# Patient Record
Sex: FEMALE | Race: WHITE | NOT HISPANIC OR LATINO | Employment: FULL TIME | URBAN - METROPOLITAN AREA
[De-identification: names, ages, dates, MRNs, and addresses within clinical notes are randomized per-mention and may not be internally consistent; named-entity substitution may affect disease eponyms.]

---

## 2023-02-27 LAB
COBALAMIN (VITAMIN B12) (PG/ML) IN SER/PLAS: 404 PG/ML (ref 211–911)
FOLATE (NG/ML) IN SER/PLAS: 10.3 NG/ML
IRON (UG/DL) IN SER/PLAS: 67 UG/DL (ref 35–150)
IRON BINDING CAPACITY (UG/DL) IN SER/PLAS: 321 UG/DL (ref 240–445)
IRON SATURATION (%) IN SER/PLAS: 21 % (ref 25–45)

## 2023-02-28 LAB — FERRITIN (UG/LL) IN SER/PLAS: 114 UG/L (ref 8–150)

## 2023-03-02 LAB
COPPER: 137.9 UG/DL (ref 80–155)
ZINC,SERUM OR PLASMA: 75.6 UG/DL (ref 60–120)

## 2023-03-05 LAB — VITAMIN B1, WHOLE BLOOD: 161 NMOL/L (ref 70–180)

## 2023-04-04 ENCOUNTER — TELEPHONE (OUTPATIENT)
Dept: PRIMARY CARE | Facility: CLINIC | Age: 33
End: 2023-04-04
Payer: COMMERCIAL

## 2023-04-04 DIAGNOSIS — N39.0 RECURRENT UTI: Primary | ICD-10-CM

## 2023-04-04 RX ORDER — NITROFURANTOIN 25; 75 MG/1; MG/1
100 CAPSULE ORAL NIGHTLY
Qty: 90 CAPSULE | Refills: 1 | Status: CANCELLED | OUTPATIENT
Start: 2023-04-04 | End: 2023-10-01

## 2023-04-04 RX ORDER — CIPROFLOXACIN 500 MG/1
500 TABLET ORAL 2 TIMES DAILY
Qty: 10 TABLET | Refills: 0 | Status: SHIPPED | OUTPATIENT
Start: 2023-04-04 | End: 2023-04-09

## 2023-04-05 DIAGNOSIS — N39.0 RECURRENT UTI: Primary | ICD-10-CM

## 2023-04-05 RX ORDER — NITROFURANTOIN 25; 75 MG/1; MG/1
100 CAPSULE ORAL DAILY
Qty: 90 CAPSULE | Refills: 1 | Status: SHIPPED | OUTPATIENT
Start: 2023-04-05 | End: 2023-10-02

## 2023-04-05 NOTE — PROGRESS NOTES
Subjective   Patient ID: Haley Drake is a 32 y.o. female who presents for No chief complaint on file..  HPI  Paged last evening due to dysuria and hematuria with clot.  Patient states this is at least third UTI in last 6 months.  Review of Systems    Objective   There were no vitals filed for this visit.   Physical Exam    Assessment/Plan   There are no diagnoses linked to this encounter.  Will treat current symptoms with Cipro 500 BID x 5 days, then to start nightly prophylaxis with Macrobid 100 mg.  Problem List Items Addressed This Visit          Genitourinary    Recurrent UTI - Primary    Relevant Medications    nitrofurantoin, macrocrystal-monohydrate, (Macrobid) 100 mg capsule

## 2023-04-29 DIAGNOSIS — F41.1 GENERALIZED ANXIETY DISORDER: ICD-10-CM

## 2023-05-01 RX ORDER — HYDROXYZINE HYDROCHLORIDE 50 MG/1
TABLET, FILM COATED ORAL
Qty: 60 TABLET | Refills: 2 | Status: SHIPPED | OUTPATIENT
Start: 2023-05-01 | End: 2024-01-15 | Stop reason: WASHOUT

## 2023-05-01 RX ORDER — HYDROXYZINE HYDROCHLORIDE 50 MG/1
50 TABLET, FILM COATED ORAL 3 TIMES DAILY
COMMUNITY
Start: 2022-03-30 | End: 2023-05-01 | Stop reason: SDUPTHER

## 2023-05-26 ENCOUNTER — OFFICE VISIT (OUTPATIENT)
Dept: PRIMARY CARE | Facility: CLINIC | Age: 33
End: 2023-05-26
Payer: COMMERCIAL

## 2023-05-26 VITALS
DIASTOLIC BLOOD PRESSURE: 83 MMHG | HEART RATE: 90 BPM | WEIGHT: 219 LBS | OXYGEN SATURATION: 98 % | BODY MASS INDEX: 40.06 KG/M2 | SYSTOLIC BLOOD PRESSURE: 114 MMHG

## 2023-05-26 DIAGNOSIS — T50.8X5D ADVERSE EFFECT OF CONTRAST MEDIA, SUBSEQUENT ENCOUNTER: Primary | ICD-10-CM

## 2023-05-26 DIAGNOSIS — T82.9XXA COMPLICATION OF INTRAVENOUS CATHETER SITE: ICD-10-CM

## 2023-05-26 PROCEDURE — 99213 OFFICE O/P EST LOW 20 MIN: CPT | Performed by: FAMILY MEDICINE

## 2023-05-26 PROCEDURE — 1036F TOBACCO NON-USER: CPT | Performed by: FAMILY MEDICINE

## 2023-05-26 RX ORDER — LEVONORGESTREL 52 MG/1
INTRAUTERINE DEVICE INTRAUTERINE
COMMUNITY
Start: 2023-01-24

## 2023-05-26 RX ORDER — METFORMIN HYDROCHLORIDE 500 MG/1
1 TABLET ORAL
COMMUNITY
Start: 2023-02-09 | End: 2024-02-15 | Stop reason: ALTCHOICE

## 2023-05-26 RX ORDER — ALBUTEROL SULFATE 90 UG/1
AEROSOL, METERED RESPIRATORY (INHALATION)
COMMUNITY
Start: 2020-04-01

## 2023-05-26 RX ORDER — ONDANSETRON HYDROCHLORIDE 8 MG/1
TABLET, FILM COATED ORAL EVERY 8 HOURS
COMMUNITY
Start: 2022-08-19 | End: 2023-12-06 | Stop reason: SDUPTHER

## 2023-05-26 RX ORDER — PRENATAL VIT CALC,IRON,FOLIC
TABLET ORAL
COMMUNITY

## 2023-05-26 RX ORDER — AMITRIPTYLINE HYDROCHLORIDE 50 MG/1
1 TABLET, FILM COATED ORAL NIGHTLY
COMMUNITY
Start: 2021-02-19

## 2023-05-26 NOTE — PROGRESS NOTES
Subjective   Patient ID: Haley Drake is a 32 y.o. female who presents for a vein injury.  She was getting a CT yesterday at [facility], with contrast.  The IV in her R antecubital fossa started blowing right away, and she had pain in her arm.  They went ahead and did the CT but realized that she had a fair amount of CT contrast in her arm.  She was taken to the ER to be seen.      ER notes reviewed    Histories reviewed      Objective   /83   Pulse 90   Wt 99.3 kg (219 lb)   SpO2 98%   BMI 40.06 kg/m²    Physical Exam    Alert adult woman, NAD    R antecubital fossa swollen and very tender.  Purple discoloration over the IV sit, about 1 inch long.    Hand with normal sensation, normal  strength      Problem List Items Addressed This Visit    None  Visit Diagnoses       Adverse effect of contrast media, subsequent encounter    -  Primary    Complication of intravenous catheter site              I advised heat for the swollen area on the inside of her arm, but ice applied to the IV site and area of most pain

## 2023-08-14 ENCOUNTER — TELEPHONE (OUTPATIENT)
Dept: PRIMARY CARE | Facility: CLINIC | Age: 33
End: 2023-08-14
Payer: COMMERCIAL

## 2023-08-14 NOTE — TELEPHONE ENCOUNTER
pt called in with back pain that is wrapping around to her chest. started out as shoulder blade pain now behind left breast and spasming. painful to breathe    I spoke with patient and she agreed to be seen at the ED quickly as advised

## 2023-12-06 ENCOUNTER — OFFICE VISIT (OUTPATIENT)
Dept: PRIMARY CARE | Facility: CLINIC | Age: 33
End: 2023-12-06
Payer: COMMERCIAL

## 2023-12-06 VITALS
HEART RATE: 97 BPM | WEIGHT: 215.6 LBS | BODY MASS INDEX: 40.08 KG/M2 | DIASTOLIC BLOOD PRESSURE: 65 MMHG | SYSTOLIC BLOOD PRESSURE: 100 MMHG | OXYGEN SATURATION: 96 %

## 2023-12-06 DIAGNOSIS — K52.9 ACUTE GASTROENTERITIS: Primary | ICD-10-CM

## 2023-12-06 PROBLEM — Z98.84 BARIATRIC SURGERY STATUS: Status: ACTIVE | Noted: 2023-12-06

## 2023-12-06 PROBLEM — N87.1 CERVICAL INTRAEPITHELIAL NEOPLASIA GRADE 2: Status: ACTIVE | Noted: 2022-01-14

## 2023-12-06 PROBLEM — F51.04 CHRONIC INSOMNIA: Status: ACTIVE | Noted: 2023-12-06

## 2023-12-06 PROBLEM — G43.009 MIGRAINE WITHOUT AURA, NOT REFRACTORY: Status: ACTIVE | Noted: 2023-12-06

## 2023-12-06 PROBLEM — G47.33 OBSTRUCTIVE SLEEP APNEA, ADULT: Status: ACTIVE | Noted: 2023-12-06

## 2023-12-06 PROBLEM — K21.9 GERD WITHOUT ESOPHAGITIS: Status: ACTIVE | Noted: 2023-12-06

## 2023-12-06 PROBLEM — G89.29 CHRONIC MIDLINE LOW BACK PAIN WITHOUT SCIATICA: Status: ACTIVE | Noted: 2023-12-06

## 2023-12-06 PROBLEM — M54.50 CHRONIC MIDLINE LOW BACK PAIN WITHOUT SCIATICA: Status: ACTIVE | Noted: 2023-12-06

## 2023-12-06 PROCEDURE — 99213 OFFICE O/P EST LOW 20 MIN: CPT | Performed by: FAMILY MEDICINE

## 2023-12-06 PROCEDURE — 1036F TOBACCO NON-USER: CPT | Performed by: FAMILY MEDICINE

## 2023-12-06 RX ORDER — ONDANSETRON HYDROCHLORIDE 8 MG/1
8 TABLET, FILM COATED ORAL EVERY 8 HOURS PRN
Qty: 20 TABLET | Refills: 0 | Status: SHIPPED | OUTPATIENT
Start: 2023-12-06

## 2023-12-06 ASSESSMENT — PATIENT HEALTH QUESTIONNAIRE - PHQ9
SUM OF ALL RESPONSES TO PHQ9 QUESTIONS 1 AND 2: 0
2. FEELING DOWN, DEPRESSED OR HOPELESS: NOT AT ALL
1. LITTLE INTEREST OR PLEASURE IN DOING THINGS: NOT AT ALL

## 2023-12-06 ASSESSMENT — ENCOUNTER SYMPTOMS
BLOOD IN STOOL: 0
DIARRHEA: 1
COUGH: 0
ABDOMINAL PAIN: 1
FEVER: 0
DYSURIA: 0

## 2023-12-06 NOTE — PROGRESS NOTES
Subjective   Patient ID: Haley Drake is a 33 y.o. female who presents for Abdominal Pain (Loose stool last 5 days, can't eat or drink due to pain).  Abdominal Pain  Associated symptoms include diarrhea. Pertinent negatives include no dysuria or fever.     Haley presents for GI symptoms that started 5 nights ago.  No specific exposure but has had loose stool/diarrhea since.  Also nauseated.  Can only tolerate saltines and clear liquids  Review of Systems   Constitutional:  Negative for fever.   Respiratory:  Negative for cough.    Gastrointestinal:  Positive for abdominal pain and diarrhea. Negative for blood in stool.   Genitourinary:  Negative for dysuria.       Objective   Vitals:    12/06/23 1529   BP: 100/65   Pulse: 97   SpO2: 96%   Weight: 97.8 kg (215 lb 9.6 oz)      Physical Exam  Constitutional:       Appearance: Normal appearance.   HENT:      Head: Normocephalic and atraumatic.      Mouth/Throat:      Mouth: Mucous membranes are moist.   Eyes:      Extraocular Movements: Extraocular movements intact.      Pupils: Pupils are equal, round, and reactive to light.   Cardiovascular:      Rate and Rhythm: Normal rate and regular rhythm.   Pulmonary:      Effort: Pulmonary effort is normal.      Breath sounds: Normal breath sounds.   Abdominal:      General: There is no distension.      Palpations: Abdomen is soft.      Tenderness: There is generalized abdominal tenderness. There is no guarding or rebound.   Musculoskeletal:      Cervical back: Normal range of motion and neck supple.   Neurological:      General: No focal deficit present.      Mental Status: She is alert and oriented to person, place, and time.   Psychiatric:         Mood and Affect: Mood normal.         Behavior: Behavior normal.         Assessment/Plan   There are no diagnoses linked to this encounter.    Problem List Items Addressed This Visit    None  Visit Diagnoses         Codes    Acute gastroenteritis    -  Primary K52.9     Discussed viral etiology, need for hydration and rest.  Will give zofran to use as needed for nausea.  If any fever or change in stool let me know     Relevant Medications    ondansetron (Zofran) 8 mg tablet

## 2023-12-22 ENCOUNTER — TELEPHONE (OUTPATIENT)
Dept: PRIMARY CARE | Facility: CLINIC | Age: 33
End: 2023-12-22
Payer: COMMERCIAL

## 2023-12-22 DIAGNOSIS — R42 VERTIGO: Primary | ICD-10-CM

## 2023-12-22 RX ORDER — MECLIZINE HYDROCHLORIDE 25 MG/1
25 TABLET ORAL 3 TIMES DAILY PRN
Qty: 90 TABLET | Refills: 0 | Status: SHIPPED | OUTPATIENT
Start: 2023-12-22 | End: 2024-01-15 | Stop reason: WASHOUT

## 2023-12-22 NOTE — TELEPHONE ENCOUNTER
Called PT and let her know rx was called into her pharmacy and she could take the medication up to t.I.d

## 2023-12-22 NOTE — TELEPHONE ENCOUNTER
I will send something to her pharmacy that can help if she feels dizzy, she can take it up to three times daily

## 2023-12-22 NOTE — TELEPHONE ENCOUNTER
Pt said she saw you 12/6/23 for a stomach bug. The Zofran helps but when she eats she gets diarrhea and now has been experiencing vertigo. Pt has been trying to stay hydrated with water and Gatorade and is wondering what else you suggest?

## 2024-01-08 ENCOUNTER — TELEPHONE (OUTPATIENT)
Dept: SURGERY | Facility: CLINIC | Age: 34
End: 2024-01-08

## 2024-01-08 ENCOUNTER — OFFICE VISIT (OUTPATIENT)
Dept: PRIMARY CARE | Facility: CLINIC | Age: 34
End: 2024-01-08
Payer: COMMERCIAL

## 2024-01-08 VITALS
SYSTOLIC BLOOD PRESSURE: 99 MMHG | DIASTOLIC BLOOD PRESSURE: 63 MMHG | OXYGEN SATURATION: 96 % | BODY MASS INDEX: 40.26 KG/M2 | WEIGHT: 216.6 LBS | HEART RATE: 85 BPM

## 2024-01-08 DIAGNOSIS — H60.311 ACUTE DIFFUSE OTITIS EXTERNA OF RIGHT EAR: Primary | ICD-10-CM

## 2024-01-08 PROCEDURE — 99213 OFFICE O/P EST LOW 20 MIN: CPT | Performed by: FAMILY MEDICINE

## 2024-01-08 PROCEDURE — 1036F TOBACCO NON-USER: CPT | Performed by: FAMILY MEDICINE

## 2024-01-08 RX ORDER — NEOMYCIN SULFATE, POLYMYXIN B SULFATE, HYDROCORTISONE 3.5; 10000; 1 MG/ML; [USP'U]/ML; MG/ML
3 SOLUTION/ DROPS AURICULAR (OTIC) 3 TIMES DAILY
Qty: 4.5 ML | Refills: 0 | Status: SHIPPED | OUTPATIENT
Start: 2024-01-08 | End: 2024-01-18

## 2024-01-08 ASSESSMENT — ENCOUNTER SYMPTOMS
SHORTNESS OF BREATH: 0
COUGH: 0
SORE THROAT: 1
FEVER: 0

## 2024-01-08 ASSESSMENT — PATIENT HEALTH QUESTIONNAIRE - PHQ9
1. LITTLE INTEREST OR PLEASURE IN DOING THINGS: NOT AT ALL
2. FEELING DOWN, DEPRESSED OR HOPELESS: NOT AT ALL
SUM OF ALL RESPONSES TO PHQ9 QUESTIONS 1 AND 2: 0

## 2024-01-08 NOTE — PROGRESS NOTES
Subjective   Patient ID: Haley Drake is a 33 y.o. female who presents for Earache (Right ear pain).  Earache   Associated symptoms include hearing loss and a sore throat. Pertinent negatives include no coughing.     Haley presents for evaluation of acute right ear pain.  Symptoms began 2-3 days ago.  Initially felt as blockage.  Had some itching as well  Review of Systems   Constitutional:  Negative for fever.   HENT:  Positive for ear pain, hearing loss and sore throat.    Respiratory:  Negative for cough and shortness of breath.    Cardiovascular:  Negative for chest pain.       Objective   Vitals:    01/08/24 1134   BP: 99/63   Pulse: 85   SpO2: 96%   Weight: 98.2 kg (216 lb 9.6 oz)      Physical Exam  Constitutional:       Appearance: Normal appearance.   HENT:      Head: Normocephalic and atraumatic.      Right Ear: Tympanic membrane normal. Decreased hearing noted. Tenderness present.      Ears:      Comments: Pinna is TTP  Eyes:      Extraocular Movements: Extraocular movements intact.      Pupils: Pupils are equal, round, and reactive to light.   Cardiovascular:      Rate and Rhythm: Normal rate and regular rhythm.   Pulmonary:      Effort: Pulmonary effort is normal.      Breath sounds: Normal breath sounds.   Abdominal:      General: There is no distension.      Tenderness: There is no abdominal tenderness.   Musculoskeletal:      Cervical back: Normal range of motion and neck supple.   Neurological:      General: No focal deficit present.      Mental Status: She is alert and oriented to person, place, and time.   Psychiatric:         Mood and Affect: Mood normal.         Behavior: Behavior normal.         Assessment/Plan   There are no diagnoses linked to this encounter.    Problem List Items Addressed This Visit    None  Visit Diagnoses         Codes    Acute diffuse otitis externa of right ear    -  Primary H60.311    Will treat empirically with Cortisporin, if no improvement in 2 days may  consider adding oral antibiotic.     Relevant Medications    neomycin-polymyxin-HC (Cortisporin) otic solution

## 2024-01-08 NOTE — TELEPHONE ENCOUNTER
Spoke with patient, name and  verified:     Patient states she is unable to eat anything solid over the past 7 weeks, pt says she is running to the restroom within an hour of eating anything. Please advise.

## 2024-01-12 NOTE — PROGRESS NOTES
BARIATRIC SURGERY CLINIC  FOLLOW UP NOTE      Name: Haley Drake  MRN: [id number]    Index Surgery  Date of Surgery: 7/7/2021   Surgeon: Davon    Surgical Procedure: Laparoscopic mariana en y gastric bypass 84607    HPI:   Presenting for follow up visit for problem focused visit.    Pt presented to PCP 12/6/2023 for 1 week of symptoms consistent with acute gastroenteritis.  Pt treated by PCP with supportive care, zofran PRN for nausea.    Since symptoms onset - nausea has improved.  Now within 30-45 minutes of eating she is experiencing frequent diarrhea - occurs with solids/proteins more often than other foods.   No melena, bloody or mucous in diarrhea.      She is stable to stay hydrated orally.  She has been using broth proteins and protein shakes without symptoms, avoids lactose.  If she eats yogurt she can get diarrhea but inconsistent.     No abdominal pain, just cramping prior to BM.    No fever, chills, nausea, vomiting.      DAILY SUPPLEMENTS:  Calcium: Calcium Citrate w/ vitamin D (1200 - 1500mg)  Multivitamin & Minerals: Bariataric Pal  Vitamin B12: MVI   Vitamin D3: in calcium  Iron/Other: Biotin  PPI:n/a      Current Outpatient Medications   Medication Sig Dispense Refill    albuterol 90 mcg/actuation inhaler Inhale.      amitriptyline (Elavil) 50 mg tablet Take 1 tablet (50 mg) by mouth once daily at bedtime.      bacillus coagulans-inulin 1 billion-250 cell-mg capsule Take 1 capsule by mouth once daily.      calcium citrate-vitamin D3 200 mg-6.25 mcg (250 unit) tablet Take by mouth.      cholestyramine light (Prevalite) 4 gram packet Take 1 packet (4 g) by mouth 3 times a day before meals. Separate from vitamins by at least 1 hour 90 packet 2    levonorgestrel (Mirena) 21 mcg/24 hours (8 yrs) 52 mg IUD by intrauterine route.      metFORMIN (Glucophage) 500 mg tablet 1 tablet (500 mg).      neomycin-polymyxin-HC (Cortisporin) otic solution Administer 3 drops into the right ear 3 times a day for 10  days. 4.5 mL 0    ondansetron (Zofran) 8 mg tablet Take 1 tablet (8 mg) by mouth every 8 hours if needed for nausea or vomiting. 20 tablet 0     No current facility-administered medications for this visit.       Comorbidities:  Patient Active Problem List   Diagnosis    Recurrent UTI    S/P gastric bypass    Cervical intraepithelial neoplasia grade 2    Chronic insomnia    Chronic midline low back pain without sciatica    Generalized anxiety disorder    GERD without esophagitis    Migraine without aura, not refractory    Obstructive sleep apnea, adult    PCOS (polycystic ovarian syndrome)    Diarrhea         REVIEW OF SYSTEMS:  CONSTITUTIONAL: Patient denies fevers, chills, sweats and weight changes.  CARDIOVASCULAR: Patient denies chest pains, palpitations, orthopnea and paroxysmal nocturnal dyspnea.  RESPIRATORY: No dyspnea on exertion, no wheezing or cough.  GI: No nausea, vomiting, diarrhea, constipation, abdominal pain, hematochezia or melena.  MUSCULOSKELETAL: No myalgias or arthralgias.  NEUROLOGIC: No chronic headaches, no seizures. Patient denies numbness, tingling or weakness.  PSYCHIATRIC: Patient denies problems with mood disturbance. No problems with anxiety.  ENDOCRINE: No excessive urination or excessive thirst.  DERMATOLOGIC: Patient denies any rashes or skin changes.    PHYSICAL EXAM:  There were no vitals taken for this visit.  Alert, well appearing, no acute distress, nourished, hydrated.  Anicteric sclera, no ptosis  Facial symmetry  Neck supple  Unlabored respirations.  Easily conversant without increased respiratory effort  Oriented to person, place, time.  Judgement intact.  Appropriate mood, affect.       ASSESSMENT & PLAN:  33 y.o. female presenting for follow up visit s/p bariatric surgery.    Current Weight:     Wt Readings from Last 1 Encounters:   01/08/24 98.2 kg (216 lb 9.6 oz)       Problem List Items Addressed This Visit       S/P gastric bypass - Primary     GBP 7/7/2021  IW  360  PreOp visit 339    No acute post bariatric surgery complications  Presenting today for chronic post prandial diarrhea within 30-45 minutes post meal since viral GI Illness last month.   Tolerating diet with appropriate protein and fluid intake with use of protein supplements.   Taking appropriate supplements    Plan:  -Continue to follow protein forward, reduced calorie, whole foods based diet, follow diet direction of bariatric RD if needed for support with optimizing protein intake given current GI symptoms.  Can continue to use protein supplements to meet goals until diet tolerance improves.  [place] diet encouraged to minimize symptoms.  -Continue to participate in regular exercise with goal to achieve 200-300 minutes per week of aerobic & resistance training for preservation of lean body mass.  -Continue multivitamin and supplements to support micronutrient needs  -Join Support Groups  -No ongoing need for anti reflux medications.  -Suspect post viral IBS vs bile acid malabsorption s/p GBP vs SIBO - add cholestyramine pre meal.  Lactose free diet encouraged.  Consult GI for further evaluation and treatment plan.   -Labs - annual labs up to date, due 7/2024           Relevant Medications    cholestyramine light (Prevalite) 4 gram packet    Other Relevant Orders    Referral to Gastroenterology    Diarrhea     -Suspect post viral IBS vs bile acid malabsorption s/p GBP vs SIBO - add cholestyramine pre meal.  Lactose free diet encouraged.  Consult GI for further evaluation and treatment plan.          Relevant Medications    cholestyramine light (Prevalite) 4 gram packet    Other Relevant Orders    Referral to Gastroenterology       I personally spent >50% of total minutes face to face with the patient in counseling and discussion and/or coordination of care as described above.

## 2024-01-15 ENCOUNTER — TELEMEDICINE (OUTPATIENT)
Dept: SURGERY | Facility: CLINIC | Age: 34
End: 2024-01-15
Payer: COMMERCIAL

## 2024-01-15 DIAGNOSIS — R19.7 DIARRHEA, UNSPECIFIED TYPE: ICD-10-CM

## 2024-01-15 DIAGNOSIS — Z98.84 S/P GASTRIC BYPASS: Primary | ICD-10-CM

## 2024-01-15 PROCEDURE — 99214 OFFICE O/P EST MOD 30 MIN: CPT | Mod: GT | Performed by: NURSE PRACTITIONER

## 2024-01-15 PROCEDURE — 99214 OFFICE O/P EST MOD 30 MIN: CPT | Performed by: NURSE PRACTITIONER

## 2024-01-15 RX ORDER — CHOLESTYRAMINE 4 G/4.8G
4 POWDER, FOR SUSPENSION ORAL
Qty: 90 PACKET | Refills: 2 | Status: SHIPPED | OUTPATIENT
Start: 2024-01-15

## 2024-01-15 NOTE — ASSESSMENT & PLAN NOTE
GBP 7/7/2021    PreOp visit 339    No acute post bariatric surgery complications  Presenting today for chronic post prandial diarrhea within 30-45 minutes post meal since viral GI Illness last month.   Tolerating diet with appropriate protein and fluid intake with use of protein supplements.   Taking appropriate supplements    Plan:  -Continue to follow protein forward, reduced calorie, whole foods based diet, follow diet direction of bariatric RD if needed for support with optimizing protein intake given current GI symptoms.  Can continue to use protein supplements to meet goals until diet tolerance improves.  [place] diet encouraged to minimize symptoms.  -Continue to participate in regular exercise with goal to achieve 200-300 minutes per week of aerobic & resistance training for preservation of lean body mass.  -Continue multivitamin and supplements to support micronutrient needs  -Join Support Groups  -No ongoing need for anti reflux medications.  -Suspect post viral IBS vs bile acid malabsorption s/p GBP vs SIBO - add cholestyramine pre meal.  Lactose free diet encouraged.  Consult GI for further evaluation and treatment plan.   -Labs - annual labs up to date, due 7/2024

## 2024-01-15 NOTE — ASSESSMENT & PLAN NOTE
-Suspect post viral IBS vs bile acid malabsorption s/p GBP vs SIBO - add cholestyramine pre meal.  Lactose free diet encouraged.  Consult GI for further evaluation and treatment plan.

## 2024-02-15 ENCOUNTER — OFFICE VISIT (OUTPATIENT)
Dept: GASTROENTEROLOGY | Facility: CLINIC | Age: 34
End: 2024-02-15
Payer: COMMERCIAL

## 2024-02-15 ENCOUNTER — LAB (OUTPATIENT)
Dept: LAB | Facility: LAB | Age: 34
End: 2024-02-15
Payer: COMMERCIAL

## 2024-02-15 VITALS
HEART RATE: 85 BPM | SYSTOLIC BLOOD PRESSURE: 104 MMHG | BODY MASS INDEX: 39.97 KG/M2 | DIASTOLIC BLOOD PRESSURE: 69 MMHG | WEIGHT: 215 LBS | TEMPERATURE: 97.8 F

## 2024-02-15 DIAGNOSIS — R19.7 DIARRHEA, UNSPECIFIED TYPE: ICD-10-CM

## 2024-02-15 DIAGNOSIS — Z98.84 S/P GASTRIC BYPASS: ICD-10-CM

## 2024-02-15 LAB
ALBUMIN SERPL BCP-MCNC: 4.2 G/DL (ref 3.4–5)
ALP SERPL-CCNC: 95 U/L (ref 33–110)
ALT SERPL W P-5'-P-CCNC: 17 U/L (ref 7–45)
ANION GAP SERPL CALC-SCNC: 13 MMOL/L (ref 10–20)
AST SERPL W P-5'-P-CCNC: 21 U/L (ref 9–39)
BILIRUB SERPL-MCNC: 0.6 MG/DL (ref 0–1.2)
BUN SERPL-MCNC: 11 MG/DL (ref 6–23)
CALCIUM SERPL-MCNC: 9.4 MG/DL (ref 8.6–10.3)
CHLORIDE SERPL-SCNC: 101 MMOL/L (ref 98–107)
CO2 SERPL-SCNC: 28 MMOL/L (ref 21–32)
CREAT SERPL-MCNC: 0.87 MG/DL (ref 0.5–1.05)
EGFRCR SERPLBLD CKD-EPI 2021: 90 ML/MIN/1.73M*2
ERYTHROCYTE [DISTWIDTH] IN BLOOD BY AUTOMATED COUNT: 13.2 % (ref 11.5–14.5)
GLUCOSE SERPL-MCNC: 83 MG/DL (ref 74–99)
HCT VFR BLD AUTO: 42.1 % (ref 36–46)
HGB BLD-MCNC: 14 G/DL (ref 12–16)
MCH RBC QN AUTO: 31 PG (ref 26–34)
MCHC RBC AUTO-ENTMCNC: 33.3 G/DL (ref 32–36)
MCV RBC AUTO: 93 FL (ref 80–100)
NRBC BLD-RTO: 0 /100 WBCS (ref 0–0)
PLATELET # BLD AUTO: 321 X10*3/UL (ref 150–450)
POTASSIUM SERPL-SCNC: 4.1 MMOL/L (ref 3.5–5.3)
PROT SERPL-MCNC: 6.7 G/DL (ref 6.4–8.2)
RBC # BLD AUTO: 4.51 X10*6/UL (ref 4–5.2)
SODIUM SERPL-SCNC: 138 MMOL/L (ref 136–145)
TSH SERPL-ACNC: 2.69 MIU/L (ref 0.44–3.98)
WBC # BLD AUTO: 6.9 X10*3/UL (ref 4.4–11.3)

## 2024-02-15 PROCEDURE — 83516 IMMUNOASSAY NONANTIBODY: CPT

## 2024-02-15 PROCEDURE — 36415 COLL VENOUS BLD VENIPUNCTURE: CPT

## 2024-02-15 PROCEDURE — 99213 OFFICE O/P EST LOW 20 MIN: CPT | Performed by: NURSE PRACTITIONER

## 2024-02-15 PROCEDURE — 80053 COMPREHEN METABOLIC PANEL: CPT

## 2024-02-15 PROCEDURE — 84443 ASSAY THYROID STIM HORMONE: CPT

## 2024-02-15 PROCEDURE — 1036F TOBACCO NON-USER: CPT | Performed by: NURSE PRACTITIONER

## 2024-02-15 PROCEDURE — 85027 COMPLETE CBC AUTOMATED: CPT

## 2024-02-15 ASSESSMENT — ENCOUNTER SYMPTOMS
CHEST TIGHTNESS: 0
FATIGUE: 0
FEVER: 0
CARDIOVASCULAR NEGATIVE: 1
ROS GI COMMENTS: SEE HPI
CHILLS: 0
NEUROLOGICAL NEGATIVE: 1
APNEA: 0
MUSCULOSKELETAL NEGATIVE: 1
WHEEZING: 0
STRIDOR: 0
RESPIRATORY NEGATIVE: 1
COUGH: 0
PSYCHIATRIC NEGATIVE: 1
EYES NEGATIVE: 1
DIFFICULTY URINATING: 0
DIAPHORESIS: 0
ALLERGIC/IMMUNOLOGIC NEGATIVE: 1
ENDOCRINE NEGATIVE: 1
SHORTNESS OF BREATH: 0
HEMATOLOGIC/LYMPHATIC NEGATIVE: 1

## 2024-02-15 ASSESSMENT — PAIN SCALES - GENERAL: PAINLEVEL: 0-NO PAIN

## 2024-02-15 NOTE — PATIENT INSTRUCTIONS
Start benefiber one teaspoon daily with a full glass of water (try one teaspoon daily with a full glass of water)    Check stool tests and blood work    Consider lactose free x 2 weeks    Follow up in 4 weeks

## 2024-02-15 NOTE — PROGRESS NOTES
Subjective   Patient ID: Haley Drake is a 33 y.o. female who presents for Diarrhea.  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 Worse since bariatric surgery.     Review of Systems   Constitutional:  Negative for chills, diaphoresis, fatigue and fever.   HENT: Negative.     Eyes: Negative.    Respiratory: Negative.  Negative for apnea, cough, chest tightness, shortness of breath, wheezing and stridor.    Cardiovascular: Negative.    Gastrointestinal:         See HPI    Endocrine: Negative.    Genitourinary: Negative.  Negative for difficulty urinating.   Musculoskeletal: Negative.    Skin: Negative.    Allergic/Immunologic: Negative.    Neurological: Negative.    Hematological: Negative.    Psychiatric/Behavioral: Negative.         Objective   Physical Exam  Constitutional:       Appearance: She is normal weight.   Neurological:      Mental Status: She is alert.   Psychiatric:         Mood and Affect: Mood normal.         Assessment/Plan   Diagnoses and all orders for this visit:  S/P gastric bypass  Diarrhea, unspecified type  -     C. difficile, PCR; Future  -     Calprotectin, Fecal; Future  -     Ova/Para + Giardia/Cryptosporidium Antigen; Future  -     Stool Pathogen Panel, PCR; Future  -     Tissue Transglutaminase IgA; Future  -     CBC; Future  -     Comprehensive Metabolic Panel; Future  -     Pancreatic Elastase, Fecal; Future  -     TSH with reflex to Free T4 if abnormal; Future    33 year old female with a PMH of gastric bypass who presents today for chronic  diarrhea, ongoing since November with nocturnal Bm's. She will complete stool tests, and trial of fiber. Follow up in 3-4 weeks.  Ddx: Infection, inflammation, SIBO,dietary        FERMIN Hanson-CNP 02/15/24 2:59 PM

## 2024-02-16 LAB — TTG IGA SER IA-ACNC: <1 U/ML

## 2024-02-21 ENCOUNTER — OFFICE VISIT (OUTPATIENT)
Dept: PRIMARY CARE | Facility: CLINIC | Age: 34
End: 2024-02-21
Payer: COMMERCIAL

## 2024-02-21 VITALS
TEMPERATURE: 96.7 F | OXYGEN SATURATION: 97 % | SYSTOLIC BLOOD PRESSURE: 110 MMHG | BODY MASS INDEX: 39.97 KG/M2 | HEART RATE: 92 BPM | DIASTOLIC BLOOD PRESSURE: 77 MMHG | WEIGHT: 215 LBS

## 2024-02-21 DIAGNOSIS — R11.2 NAUSEA AND VOMITING, UNSPECIFIED VOMITING TYPE: ICD-10-CM

## 2024-02-21 DIAGNOSIS — R50.9 ACUTE FEBRILE ILLNESS: Primary | ICD-10-CM

## 2024-02-21 PROCEDURE — 99214 OFFICE O/P EST MOD 30 MIN: CPT | Performed by: FAMILY MEDICINE

## 2024-02-21 PROCEDURE — 87636 SARSCOV2 & INF A&B AMP PRB: CPT

## 2024-02-21 PROCEDURE — 1036F TOBACCO NON-USER: CPT | Performed by: FAMILY MEDICINE

## 2024-02-21 RX ORDER — PROCHLORPERAZINE MALEATE 10 MG
10 TABLET ORAL 3 TIMES DAILY PRN
Qty: 20 TABLET | Refills: 0 | Status: SHIPPED | OUTPATIENT
Start: 2024-02-21 | End: 2024-04-21

## 2024-02-21 ASSESSMENT — ENCOUNTER SYMPTOMS
FEVER: 1
NAUSEA: 1
SLEEP DISTURBANCE: 1
VOMITING: 1
DIARRHEA: 1
CHILLS: 1
RHINORRHEA: 1
FATIGUE: 1
DYSURIA: 0
APPETITE CHANGE: 1
MYALGIAS: 1
SORE THROAT: 0
COUGH: 1

## 2024-02-21 ASSESSMENT — PATIENT HEALTH QUESTIONNAIRE - PHQ9
2. FEELING DOWN, DEPRESSED OR HOPELESS: NOT AT ALL
SUM OF ALL RESPONSES TO PHQ9 QUESTIONS 1 AND 2: 0
1. LITTLE INTEREST OR PLEASURE IN DOING THINGS: NOT AT ALL

## 2024-02-21 NOTE — LETTER
February 21, 2024     Patient: Haley Drake   YOB: 1990   Date of Visit: 2/21/2024       To Whom It May Concern:    Haley Drake was seen in my clinic on 2/21/2024 at 11:40 am. Please excuse Haley for her absence from work from Monday 2/19 through Friday 2/23.    If you have any questions or concerns, please don't hesitate to call.         Sincerely,         Zane Ashton MD        CC: No Recipients   The patient was instructed to follow our dietary recommendations regarding a high lean protein, low carb and low fat diet. Reviewed appropriate amount of water to intake daily. Be sure to take vitamins as recommended. Patient was instructed to get at least 150 minutes of exercise weekly including both cardio and strength training.

## 2024-02-21 NOTE — PROGRESS NOTES
Subjective   Patient ID: Haley Drake is a 33 y.o. female who presents for Fever, Cough, and Nasal Congestion.  Fever   Associated symptoms include congestion, coughing, diarrhea, nausea and vomiting. Pertinent negatives include no rash, sore throat or urinary pain.   Cough  Associated symptoms include chills, a fever, myalgias and rhinorrhea. Pertinent negatives include no rash or sore throat.     Haley presents for sick visit.  She has not felt well for last 3 months, working with [employer], but for last 3 days has had fever, body aches, chills, body aches, dry heaving.  Not getting much relief from Zofran.  Review of Systems   Constitutional:  Positive for appetite change, chills, fatigue and fever.   HENT:  Positive for congestion and rhinorrhea. Negative for sore throat.    Respiratory:  Positive for cough.    Gastrointestinal:  Positive for diarrhea, nausea and vomiting.   Genitourinary:  Negative for dysuria.   Musculoskeletal:  Positive for myalgias.   Skin:  Negative for rash.   Psychiatric/Behavioral:  Positive for sleep disturbance.        Objective   Vitals:    02/21/24 1134   BP: 110/77   Pulse: 92   Temp: 35.9 °C (96.7 °F)   SpO2: 97%   Weight: 97.5 kg (215 lb)      Physical Exam  Constitutional:       Appearance: She is toxic-appearing.   HENT:      Head: Normocephalic and atraumatic.      Right Ear: Tympanic membrane and ear canal normal.      Left Ear: Tympanic membrane and ear canal normal.      Nose: Nose normal.      Mouth/Throat:      Mouth: Mucous membranes are moist.      Pharynx: No oropharyngeal exudate or posterior oropharyngeal erythema.   Eyes:      Extraocular Movements: Extraocular movements intact.      Pupils: Pupils are equal, round, and reactive to light.   Cardiovascular:      Rate and Rhythm: Normal rate and regular rhythm.   Pulmonary:      Effort: Pulmonary effort is normal.      Breath sounds: Normal breath sounds.   Abdominal:      General: Bowel sounds are normal. There is no  distension.      Palpations: Abdomen is soft.      Tenderness: There is generalized abdominal tenderness. There is no guarding or rebound.   Musculoskeletal:      Cervical back: Normal range of motion and neck supple.   Neurological:      General: No focal deficit present.      Mental Status: She is alert and oriented to person, place, and time.   Psychiatric:         Mood and Affect: Mood normal.         Behavior: Behavior normal.         Assessment/Plan   There are no diagnoses linked to this encounter.    Problem List Items Addressed This Visit    None  Visit Diagnoses         Codes    Acute febrile illness    -  Primary R50.9    High suspicion for influenza.  Will check nasal swab for COVID/FluA/B.  Advised rest, hydration     Relevant Orders    Sars-CoV-2 and Influenza A/B PCR    Nausea and vomiting, unspecified vomiting type     R11.2    Poor response to Zofran.  Will trial Compazine.     Relevant Medications    prochlorperazine (Compazine) 10 mg tablet

## 2024-02-22 LAB
FLUAV RNA RESP QL NAA+PROBE: NOT DETECTED
FLUBV RNA RESP QL NAA+PROBE: NOT DETECTED
SARS-COV-2 RNA RESP QL NAA+PROBE: NOT DETECTED

## 2024-03-12 ENCOUNTER — APPOINTMENT (OUTPATIENT)
Dept: GASTROENTEROLOGY | Facility: CLINIC | Age: 34
End: 2024-03-12
Payer: COMMERCIAL

## 2024-08-04 PROBLEM — K91.2 POSTOPERATIVE MALABSORPTION (HHS-HCC): Status: ACTIVE | Noted: 2024-08-04

## 2024-08-04 NOTE — ASSESSMENT & PLAN NOTE
Prior- -suspect post viral IBS vs bile acid malabsorption s/p GBP vs SIBO - add cholestyramine pre meal. Lactose free diet encouraged. Consult GI for further evaluation and treatment plan.   -Current Impression: she denies issue since 1/24. She did follow up with GI for more comprehensive workup, results benign. No further issue. BM are now regular.

## 2024-08-04 NOTE — PROGRESS NOTES
BARIATRIC [facility]ERY CLINIC  Comprehensive Weight Management        Name: Haley Drake  MRN: [id number]     Index Surgery  Date of Surgery: 7/7/2021   Surgeon: Davon                         Surgical Procedure: Laparoscopic mariana en y gastric bypass [id number]    Virtual or Telephone Consent: A telephone visit (audio or visual only) between the patient (at the originating site) and the provider (at the distant site) was utilized to provide this telehealth service. Verbal consent was requested and obtained from Haley Drake on this date, 08/05/24  for a telehealth visit.     HPI:   Haley Drake is a 34 y.o. female presenting for 3 year annual pov s/p RYGB. Visit prior December 2023 was problem focused for potential dumping syndrome following acute gastroenteritis. At the time she was treated by PCP with supportive care, zofran PRN for nausea. Overall symptoms improved however within 30-45 minutes of eating she is experiencing frequent diarrhea - occurs with solids/proteins more often than other foods. No melena, bloody or mucous in diarrhea. Prior impression- she is stable to stay hydrated orally.  She has been using broth proteins and protein shakes without symptoms, avoids lactose.  If she eats yogurt she can get diarrhea but inconsistent. No abdominal pain, just cramping prior to BM. No fever, chills, nausea, vomiting.       Diet:  - Stage: regular food diet  - Achieving protein and fluid goals: yes    Exercise:  - walking 1 hr / day  - yoga 4-5x / week     Concerns related to:  Nausea/Vomiting, Reflux: denies  Abdominal Pain: denies  Diarrhea/Constipation- bowel function is regular, cleared by GI. No longer reports issue with diarrhea (since January 2024).     DAILY SUPPLEMENTS:  Calcium: Calcium Citrate w/ vitamin D (1200 - 1500mg)  Multivitamin & Minerals: Bariataric Pal  Vitamin B12: MVI   Vitamin D3: in calcium  Iron/Other: Biotin  PPI:n/a    Primary Medical Hx:  Patient Active Problem List   Diagnosis     Recurrent UTI    S/P gastric bypass    Cervical intraepithelial neoplasia grade 2    Chronic insomnia    Chronic midline low back pain without sciatica    Generalized anxiety disorder    GERD without esophagitis    Migraine without aura, not refractory    Obstructive sleep apnea, adult    PCOS (polycystic ovarian syndrome)    Diarrhea    Postoperative malabsorption ([facility])      Past Surgical History:   Procedure Laterality Date    OTHER SURGICAL HISTORY  2019     section    OTHER SURGICAL HISTORY  2020    Oophorectomy      Social History     Socioeconomic History    Marital status: Single     Spouse name: Not on file    Number of children: Not on file    Years of education: Not on file    Highest education level: Not on file   Occupational History    Not on file   Tobacco Use    Smoking status: Never    Smokeless tobacco: Never   Substance and Sexual Activity    Alcohol use: Not Currently     Comment: sometimes    Drug use: Never    Sexual activity: Not on file   Other Topics Concern    Not on file   Social History Narrative    Not on file     Social Determinants of Health     Financial Resource Strain: Not on File (10/5/2021)    Received from [facility]    Financial Resource Strain     Financial Resource Strain: 0   Food Insecurity: Not on File (10/5/2021)    Received from [facility]    Food Insecurity     Food: 0   Transportation Needs: Not on File (10/5/2021)    Received from [facility]    Transportation Needs     Transportation: 0   Physical Activity: Not on File (10/5/2021)    Received from [facility]    Physical Activity     Physical Activity: 0   Stress: Not on File (10/5/2021)    Received from [facility]    Stress     Stress: 0   Social Connections: Not on File (10/5/2021)    Received from [facility]    Social Connections     Social Connections and Isolation: 0   Intimate Partner Violence: Not on file   Housing Stability: Not on File (10/5/2021)    Received from [facility]  "   Housing Stability     Housin     No family history on file.   Current Outpatient Medications on File Prior to Visit   Medication Sig Dispense Refill    albuterol 90 mcg/actuation inhaler Inhale.      amitriptyline (Elavil) 50 mg tablet Take 1 tablet (50 mg) by mouth once daily at bedtime.      bacillus coagulans-inulin 1 billion-250 cell-mg capsule Take 1 capsule by mouth once daily.      calcium citrate-vitamin D3 200 mg-6.25 mcg (250 unit) tablet Take by mouth.      cholestyramine light (Prevalite) 4 gram packet Take 1 packet (4 g) by mouth 3 times a day before meals. Separate from vitamins by at least 1 hour 90 packet 2    levonorgestrel (Mirena) 21 mcg/24 hours (8 yrs) 52 mg IUD by intrauterine route.      ondansetron (Zofran) 8 mg tablet Take 1 tablet (8 mg) by mouth every 8 hours if needed for nausea or vomiting. 20 tablet 0     No current facility-administered medications on file prior to visit.      No Known Allergies     REVIEW OF SYSTEMS:  PHYSICAL EXAM  HT: 5'1.5\" Wt: 218 lbs   BMI: Body mass index is 40.52 kg/m².  Negative unless otherwise reviewed in HPI.  Alert, well appearing, no acute distress, nourished, hydrated.  Anicteric sclera, no ptosis  Facial symmetry  Neck supple  Unlabored respirations.  Easily conversant without increased respiratory effort  Oriented to person, place, time.  Judgement intact.  Appropriate mood, affect.      ASSESSMENT & PLAN:  34 y.o. female presenting for follow up visit s/p bariatric surgery. Three year annual pov. Will submit lab orders for patient to complete following today's visit. Overall she is doing great with weight loss >140 lbs. No current issues. Denies abdominal pain, n, v, reflux. Taking appropriate vitamins and supplements, achieving protein and fluid goals. Follow up is in one year for annual pov, sooner if needed.      Weight Impression:  IW: 360 lbs   Prior Wt: 215 lbs (2024)  Current Weight: 218 lbs   % Total Weight Loss: -39.44 %     Problem " List Items Addressed This Visit       S/P gastric bypass     GBP 7/7/2021    PreOp visit 339     No acute post bariatric surgery complications  Prior issue with post prandial diarrhea within 30-45 minutes post meal since viral GI Illness 01/2024, since resolved.  Tolerating diet with appropriate protein and fluid intake with use of protein supplements.   Taking appropriate supplements     Plan:  -Continue to follow protein forward, reduced calorie, whole foods based diet, follow diet direction of bariatric RD if needed for support with optimizing protein intake given current GI symptoms.  Can continue to use protein supplements to meet goals until diet tolerance improves.  [place] diet encouraged to minimize symptoms.  -Continue to participate in regular exercise with goal to achieve 200-300 minutes per week of aerobic & resistance training for preservation of lean body mass.  -Continue multivitamin and supplements to support micronutrient needs  -Join Support Groups  -No ongoing need for anti reflux medications.  -Labs - annual labs see orders.         Relevant Orders    TSH with reflex to Free T4 if abnormal    Lipid Panel    Comprehensive Metabolic Panel    Parathyroid Hormone, Intact    CBC    GERD without esophagitis     - denies current s/sx and is no longer taking ppi         Relevant Orders    Iron and TIBC    Comprehensive Metabolic Panel    CBC    Obstructive sleep apnea, adult     - continue to follow with sleep medicine as needed         Diarrhea     Prior- -suspect post viral IBS vs bile acid malabsorption s/p GBP vs SIBO - add cholestyramine pre meal. Lactose free diet encouraged. Consult GI for further evaluation and treatment plan.   -Current Impression: she denies issue since 1/24. She did follow up with GI for more comprehensive workup, results benign. No further issue. BM are now regular.           Relevant Orders    Iron and TIBC    Comprehensive Metabolic Panel    Postoperative malabsorption  ([facility]) - Primary     Check micronutrient levels - see orders  Adjust micronutrient supplementation per results  Continue recommended post bariatric surgery supplements         Relevant Orders    Zinc, Serum or Plasma    Vitamin B12    Vitamin A    Vitamin K    Ferritin    Vitamin B6    Lipid Panel    Comprehensive Metabolic Panel    Vitamin D 25-Hydroxy,Total (for eval of Vitamin D levels)    Parathyroid Hormone, Intact    Vitamin B1, Whole Blood    Folate    Copper, Blood   Please see Patient Instructions for today's relevant referrals, orders and instructions.      Follow Up: Follow up in about 1 year (around 8/5/2025).     Shyla Max, APRN-CNP

## 2024-08-04 NOTE — PATIENT INSTRUCTIONS
"The following are the recommendations we discussed at your appointment today:  1. Nutrition  - Please make sure you are seeing the bariatric dietitian regularly.    Dietitian Information:   Mena Davis: [phone]  [facility] [phone]    Your Protein Goals will gradually increase as you get further out from surgery:  0-3 months - 60 GRAMS PER DAY  3-6 months - 60-75 GRAMS PER DAY  6-12 months - 75-90 GRAMS PER DAY  12+ months - 80-90 GRAMS PER DAY    - Follow Pouch Rules;.   Stop drinking 30 minutes before your meals, Take 30 minutes to eat your meal   - NO DRINKING DURING MEALS, Wait for 30 minutes after your meal to drink  - Eat 5 servings of fruits and veggies daily.  A serving is 1 small (tennis ball size) piece of whole fruit, 1/2 cup fresh fruit, 1 cup non starchy vegetables  - Eat 3 small meals and 1-2 healthy, protein rich, snacks per day.    EAT PROTEIN FIRST AT MEALS, 2nd non starchy vegetable or fruit serving, consume starches last and aim for whole grains of small portion.  This pattern of eating ensures you are getting full on high quality protein and higher fiber foods with many vitamins and minerals to support adequate nutrition first.      2. Exercise Recommendations:    Regular physical activity is CRITICAL for long term successful weight management.    Strive for a minimum weekly exercise goal of at least 200 minutes per week of aerobic exercise (45 minutes at least 5 days per week or 30 minutes daily)    Strength based resistance training is critical for helping to maintain and build lean body mass/muscle mass which is very important for long term health.  Having higher muscle mass is associated with better health outcomes and can help to maintain higher calorie expenditure.      Designing a Strength Program:  Select 3-4 exercises that target different major muscle groups.  - Emphasize the following movements \"pull, push, squat, hip hinge\"  \"Pull\" examples - pull up, " "bent over row or dumbbell row, pull down with weight bar or resistance band  \"Push\" examples - push up, bench press, chest flys, dips, overhead press, dumbbell bench press  \"Squat\" examples - air squat, chair squat, lunge steps, goblet squat, front squat, etc  \"Hip hinge\" examples - kettle bell swing, good morning, glute bridge, deadlift, suitcase pick ups, hip thrust    Perform two to three sets of eight to 15 repetitions of each exercise.  Try to use a resistance that feels like an \"8 out of 10\" effort, with 10 being the highest effort you can give.    To get stronger, try increasing either the weight, number of repetitions, number of sets or number of exercises every 4-8 weeks as you feel more comfortable with your current program.      3. Fluids  - Drink at least 60 oz of water every day.   - Wait 30 minutes before or after meals to drink fluids.  - Avoid carbonated beverages.    4. Vitamins  - Remember to take your multivitamins 2 times daily, once in the morning and once in the evening  - Take your calcium 2-3 times daily, at least 2 hours apart from the multivitamin - total daily dose at least 1200-1500mg per day.    - Vitamin D3 3000 units per day  - B12 - depending on your blood work and how well you absorb B12 from your multivitamin you may need additional B12 of 350-500mcg oral per day.    5. Labs  - We will check labs today and results will be communicated via [facility] My Chart  - A copy of the results can be viewed on  My Chart.      Follow-up with Dietitian for bariatric diet optimization  Follow-up with PCP for general health questions and ongoing management of routine health concerns      "

## 2024-08-04 NOTE — ASSESSMENT & PLAN NOTE
GBP 7/7/2021    PreOp visit 339     No acute post bariatric surgery complications  Prior issue with post prandial diarrhea within 30-45 minutes post meal since viral GI Illness 01/2024, since resolved.  Tolerating diet with appropriate protein and fluid intake with use of protein supplements.   Taking appropriate supplements     Plan:  -Continue to follow protein forward, reduced calorie, whole foods based diet, follow diet direction of bariatric RD if needed for support with optimizing protein intake given current GI symptoms.  Can continue to use protein supplements to meet goals until diet tolerance improves.  White diet encouraged to minimize symptoms.  -Continue to participate in regular exercise with goal to achieve 200-300 minutes per week of aerobic & resistance training for preservation of lean body mass.  -Continue multivitamin and supplements to support micronutrient needs  -Join Support Groups  -No ongoing need for anti reflux medications.  -Labs - annual labs see orders.

## 2024-08-05 ENCOUNTER — APPOINTMENT (OUTPATIENT)
Dept: SURGERY | Facility: CLINIC | Age: 34
End: 2024-08-05
Payer: COMMERCIAL

## 2024-08-05 ENCOUNTER — LAB (OUTPATIENT)
Dept: LAB | Facility: LAB | Age: 34
End: 2024-08-05
Payer: COMMERCIAL

## 2024-08-05 ENCOUNTER — TELEMEDICINE CLINICAL SUPPORT (OUTPATIENT)
Dept: SURGERY | Facility: CLINIC | Age: 34
End: 2024-08-05
Payer: COMMERCIAL

## 2024-08-05 VITALS — BODY MASS INDEX: 40.12 KG/M2 | WEIGHT: 218 LBS | HEIGHT: 62 IN

## 2024-08-05 DIAGNOSIS — R19.7 DIARRHEA, UNSPECIFIED TYPE: ICD-10-CM

## 2024-08-05 DIAGNOSIS — Z98.84 S/P GASTRIC BYPASS: ICD-10-CM

## 2024-08-05 DIAGNOSIS — K21.9 GERD WITHOUT ESOPHAGITIS: ICD-10-CM

## 2024-08-05 DIAGNOSIS — K91.2 POSTOPERATIVE MALABSORPTION (HHS-HCC): ICD-10-CM

## 2024-08-05 DIAGNOSIS — G47.33 OBSTRUCTIVE SLEEP APNEA, ADULT: ICD-10-CM

## 2024-08-05 DIAGNOSIS — K91.2 POSTOPERATIVE MALABSORPTION (HHS-HCC): Primary | ICD-10-CM

## 2024-08-05 LAB
25(OH)D3 SERPL-MCNC: 46 NG/ML (ref 30–100)
ALBUMIN SERPL BCP-MCNC: 4.1 G/DL (ref 3.4–5)
ALP SERPL-CCNC: 82 U/L (ref 33–110)
ALT SERPL W P-5'-P-CCNC: 18 U/L (ref 7–45)
ANION GAP SERPL CALC-SCNC: 11 MMOL/L (ref 10–20)
AST SERPL W P-5'-P-CCNC: 20 U/L (ref 9–39)
BILIRUB SERPL-MCNC: 0.4 MG/DL (ref 0–1.2)
BUN SERPL-MCNC: 12 MG/DL (ref 6–23)
CALCIUM SERPL-MCNC: 9.3 MG/DL (ref 8.6–10.3)
CHLORIDE SERPL-SCNC: 104 MMOL/L (ref 98–107)
CHOLEST SERPL-MCNC: 185 MG/DL (ref 0–199)
CHOLESTEROL/HDL RATIO: 3.5
CO2 SERPL-SCNC: 27 MMOL/L (ref 21–32)
CREAT SERPL-MCNC: 1.07 MG/DL (ref 0.5–1.05)
EGFRCR SERPLBLD CKD-EPI 2021: 70 ML/MIN/1.73M*2
ERYTHROCYTE [DISTWIDTH] IN BLOOD BY AUTOMATED COUNT: 13 % (ref 11.5–14.5)
FERRITIN SERPL-MCNC: 37 NG/ML (ref 8–150)
FOLATE SERPL-MCNC: 4.4 NG/ML
GLUCOSE SERPL-MCNC: 73 MG/DL (ref 74–99)
HCT VFR BLD AUTO: 42 % (ref 36–46)
HDLC SERPL-MCNC: 52.9 MG/DL
HGB BLD-MCNC: 13.6 G/DL (ref 12–16)
IRON SATN MFR SERPL: 22 % (ref 25–45)
IRON SERPL-MCNC: 79 UG/DL (ref 35–150)
LDLC SERPL CALC-MCNC: 112 MG/DL
MCH RBC QN AUTO: 30.6 PG (ref 26–34)
MCHC RBC AUTO-ENTMCNC: 32.4 G/DL (ref 32–36)
MCV RBC AUTO: 95 FL (ref 80–100)
NON HDL CHOLESTEROL: 132 MG/DL (ref 0–149)
NRBC BLD-RTO: 0 /100 WBCS (ref 0–0)
PLATELET # BLD AUTO: 300 X10*3/UL (ref 150–450)
POTASSIUM SERPL-SCNC: 4.4 MMOL/L (ref 3.5–5.3)
PROT SERPL-MCNC: 6.4 G/DL (ref 6.4–8.2)
RBC # BLD AUTO: 4.44 X10*6/UL (ref 4–5.2)
SODIUM SERPL-SCNC: 138 MMOL/L (ref 136–145)
T4 FREE SERPL-MCNC: 1.61 NG/DL (ref 0.61–1.12)
TIBC SERPL-MCNC: 352 UG/DL (ref 240–445)
TRIGL SERPL-MCNC: 100 MG/DL (ref 0–149)
TSH SERPL-ACNC: 5.3 MIU/L (ref 0.44–3.98)
UIBC SERPL-MCNC: 273 UG/DL (ref 110–370)
VIT B12 SERPL-MCNC: 483 PG/ML (ref 211–911)
VLDL: 20 MG/DL (ref 0–40)
WBC # BLD AUTO: 7.2 X10*3/UL (ref 4.4–11.3)

## 2024-08-05 PROCEDURE — 84597 ASSAY OF VITAMIN K: CPT

## 2024-08-05 PROCEDURE — 84439 ASSAY OF FREE THYROXINE: CPT

## 2024-08-05 PROCEDURE — 82607 VITAMIN B-12: CPT

## 2024-08-05 PROCEDURE — 85027 COMPLETE CBC AUTOMATED: CPT

## 2024-08-05 PROCEDURE — 82306 VITAMIN D 25 HYDROXY: CPT

## 2024-08-05 PROCEDURE — 84630 ASSAY OF ZINC: CPT

## 2024-08-05 PROCEDURE — 82525 ASSAY OF COPPER: CPT

## 2024-08-05 PROCEDURE — 82746 ASSAY OF FOLIC ACID SERUM: CPT

## 2024-08-05 PROCEDURE — 82728 ASSAY OF FERRITIN: CPT

## 2024-08-05 PROCEDURE — 80053 COMPREHEN METABOLIC PANEL: CPT

## 2024-08-05 PROCEDURE — 3008F BODY MASS INDEX DOCD: CPT

## 2024-08-05 PROCEDURE — 83550 IRON BINDING TEST: CPT

## 2024-08-05 PROCEDURE — 84425 ASSAY OF VITAMIN B-1: CPT

## 2024-08-05 PROCEDURE — 80061 LIPID PANEL: CPT

## 2024-08-05 PROCEDURE — 84207 ASSAY OF VITAMIN B-6: CPT

## 2024-08-05 PROCEDURE — 83970 ASSAY OF PARATHORMONE: CPT

## 2024-08-05 PROCEDURE — 99214 OFFICE O/P EST MOD 30 MIN: CPT

## 2024-08-05 PROCEDURE — 84443 ASSAY THYROID STIM HORMONE: CPT

## 2024-08-05 PROCEDURE — 84590 ASSAY OF VITAMIN A: CPT

## 2024-08-05 PROCEDURE — 83540 ASSAY OF IRON: CPT

## 2024-08-05 PROCEDURE — 36415 COLL VENOUS BLD VENIPUNCTURE: CPT

## 2024-08-05 NOTE — PROGRESS NOTES
Surgery Date: 7/7/21  Surgeon: Davon  Procedure: gastric bypass    ASSESSMENT:  Current weight pounds:    218.0              Ht:  61.0   in.   BMI:  40.5  Previous weight pounds:   220.0   8/7/23  Initial start weight: 366lbs  EBW: 210  Total weight change pounds :  148.0  %EBW Lost: 70.5%    PROGRESS:  Nutrition Interventions for last encounter (date):   1. Continue to eat at least 80 of protein per day  2. Continue to drink 64 oz. of zero calorie beverages per day  3. Continue no drinking 30 min before, during the meal and for 30 minutes after the meal  4. Norberto in 30 min of strength exercises 2x/week. Look up exercise routines on You Tube and Pinterest.   5. Continue current vit/min regimen  6. Attend monthly support groups    CHANGES IN TREATMENT:   Patient met goals: Yes     24 hour food recall:   Breakfast:  turkey sausage yas, 1 egg, and cheese   Snack: none  Lunch:   3 oz ground  turkey  21 g   Snack:   protein shake 30 g   Dinner:    5 oz pulled pork, 1 slice cheese   Snack:   none  Beverages:     oz water/day  Alcohol:   none      Vitamins:   1200 mg  calcium citrate, Bariatric Pal all in one (45 mg iron and 1000 mcg B12)  Physical Activity:  walking for 60 min 5x/week     READINESS TO LEARN:  Motivation to learn:           Interested      Understanding of instruction: Good      Anticipated Compliance: Good    Family Support: Unable to assess-family not present     Patient presents with post-op weight loss surgery gastric bypass.  Pt is 3 years postop.   Patient has lost 148.0  pounds since initial assessment accounting for 70.5% loss excess body weight.  Tolerating a regular diet without difficulty.  Protein intake is  adequate for post-op individual. Fluid consumption is adequate. Patient is supplementing recommended vitamin/minerals. Pt states no concerns/difficulties at this time.  Pt continues to do very well.     Malnutrition Screening  Significant unintentional weight loss? n/a  Eating less  than 75% of usual intake for more than 2 weeks? n/a     Nutrition Diagnosis:   1. Increased protein and nutrition needs related to altered GI function as evidenced by pt. s/p gastric bypass.  Nutrition Interventions:   1. Modify type and amount of food and nutrients within meals and snacks.  2. Comprehensive Nutrition Education  -Nutrition education materials: SG schedule        Recommendations:  Great job!!  1. Continue to eat at least 80 g of protein per day  2. Continue to drink 64 oz. of zero calorie beverages per day  3. Continue no drinking 30 min before, during the meal and for 30 minutes after the meal  4. Increase intensity and duration of exercise  5. Continue current vit/min regimen  6.         Attend monthly support groups    Nutrition Monitoring and Evaluation:   1-2 pounds weight loss per week  Criteria: weight check, food recall  Need for Follow-up:   1 year.      Susan SNEED, [facility]  Bariatric Surgery Dietitian  Phone: [phone]  Fax: [phone]

## 2024-08-06 LAB — PTH-INTACT SERPL-MCNC: 58.6 PG/ML (ref 18.5–88)

## 2024-08-07 ENCOUNTER — PATIENT MESSAGE (OUTPATIENT)
Dept: SURGERY | Facility: CLINIC | Age: 34
End: 2024-08-07
Payer: COMMERCIAL

## 2024-08-07 DIAGNOSIS — E03.9 HYPOTHYROIDISM, UNSPECIFIED TYPE: Primary | ICD-10-CM

## 2024-08-07 LAB
COPPER SERPL-MCNC: 118.8 UG/DL (ref 80–155)
ZINC SERPL-MCNC: 56 UG/DL (ref 60–120)

## 2024-08-07 RX ORDER — FERROUS SULFATE 325(65) MG
325 TABLET, DELAYED RELEASE (ENTERIC COATED) ORAL
Qty: 60 TABLET | Refills: 11 | Status: SHIPPED | OUTPATIENT
Start: 2024-08-07 | End: 2025-08-07

## 2024-08-07 RX ORDER — ASCORBIC ACID 250 MG
250 TABLET ORAL 2 TIMES DAILY
Qty: 180 TABLET | Refills: 0 | Status: SHIPPED | OUTPATIENT
Start: 2024-08-07 | End: 2024-11-05

## 2024-08-07 RX ORDER — FOLIC ACID 0.4 MG
400 TABLET ORAL DAILY
Qty: 90 TABLET | Refills: 0 | Status: SHIPPED | OUTPATIENT
Start: 2024-08-07 | End: 2024-11-05

## 2024-08-08 LAB
ANNOTATION COMMENT IMP: NORMAL
PYRIDOXAL PHOS SERPL-SCNC: 170.2 NMOL/L (ref 20–125)
RETINYL PALMITATE SERPL-MCNC: <0.02 MG/L (ref 0–0.1)
VIT A SERPL-MCNC: 0.62 MG/L (ref 0.3–1.2)

## 2024-08-11 LAB — VIT B1 PYROPHOSHATE BLD-SCNC: 111 NMOL/L (ref 70–180)

## 2024-08-12 LAB — PHYTONADIONE SERPL-MCNC: 1.1 NMOL/L (ref 0.22–4.88)

## 2024-08-19 ENCOUNTER — APPOINTMENT (OUTPATIENT)
Dept: PRIMARY CARE | Facility: CLINIC | Age: 34
End: 2024-08-19
Payer: COMMERCIAL

## 2024-08-19 ENCOUNTER — TELEPHONE (OUTPATIENT)
Dept: SURGERY | Facility: CLINIC | Age: 34
End: 2024-08-19

## 2024-08-19 VITALS
OXYGEN SATURATION: 96 % | DIASTOLIC BLOOD PRESSURE: 71 MMHG | SYSTOLIC BLOOD PRESSURE: 106 MMHG | HEART RATE: 97 BPM | BODY MASS INDEX: 39.97 KG/M2 | WEIGHT: 215 LBS

## 2024-08-19 DIAGNOSIS — N91.2 AMENORRHEA: ICD-10-CM

## 2024-08-19 DIAGNOSIS — G44.201 ACUTE INTRACTABLE TENSION-TYPE HEADACHE: Primary | ICD-10-CM

## 2024-08-19 DIAGNOSIS — R79.89 ABNORMAL TSH: ICD-10-CM

## 2024-08-19 DIAGNOSIS — K91.2 POSTOPERATIVE MALABSORPTION (HHS-HCC): ICD-10-CM

## 2024-08-19 PROCEDURE — 1036F TOBACCO NON-USER: CPT | Performed by: FAMILY MEDICINE

## 2024-08-19 PROCEDURE — 99214 OFFICE O/P EST MOD 30 MIN: CPT | Performed by: FAMILY MEDICINE

## 2024-08-19 RX ORDER — PREDNISONE 20 MG/1
40 TABLET ORAL DAILY
Qty: 8 TABLET | Refills: 0 | Status: SHIPPED | OUTPATIENT
Start: 2024-08-19 | End: 2024-08-23

## 2024-08-19 RX ORDER — OMEPRAZOLE 40 MG/1
40 CAPSULE, DELAYED RELEASE ORAL
Qty: 30 CAPSULE | Refills: 0 | Status: SHIPPED | OUTPATIENT
Start: 2024-08-19 | End: 2024-09-18

## 2024-08-19 RX ORDER — ASCORBIC ACID 250 MG
250 TABLET ORAL 2 TIMES DAILY
Qty: 180 TABLET | Refills: 0 | Status: SHIPPED | OUTPATIENT
Start: 2024-08-19 | End: 2024-11-17

## 2024-08-19 RX ORDER — CYCLOBENZAPRINE HCL 10 MG
10 TABLET ORAL 3 TIMES DAILY PRN
Qty: 20 TABLET | Refills: 0 | Status: SHIPPED | OUTPATIENT
Start: 2024-08-19 | End: 2024-10-18

## 2024-08-19 RX ORDER — FERROUS SULFATE 325(65) MG
325 TABLET, DELAYED RELEASE (ENTERIC COATED) ORAL
Qty: 180 TABLET | Refills: 0 | Status: SHIPPED | OUTPATIENT
Start: 2024-08-19 | End: 2024-11-17

## 2024-08-19 RX ORDER — FOLIC ACID 0.4 MG
400 TABLET ORAL DAILY
Qty: 90 TABLET | Refills: 0 | Status: SHIPPED | OUTPATIENT
Start: 2024-08-19 | End: 2024-11-17

## 2024-08-19 NOTE — TELEPHONE ENCOUNTER
Patient states that scripts sent by [facility] were not received by pharmacy due to power outage. Routing for review.

## 2024-08-19 NOTE — PROGRESS NOTES
Subjective   Patient ID: Haley Drake is a 34 y.o. female who presents for Headache (Pt states she is here for cluster headaches which have been going on for about three weeks. Pt also wants to discuss recent bloodwork results. Pt also states period has been irregular and is concerned about that as well.).  This is an entirely new headache for her, and have not been diagnosed, she is just calling them cluster headaches for lack of better terminology.    3 weeks ago when the headache started she thought it was her migraines.  The headache is present when she wakes and all day.  It has been present every single day for 3 weeks.  The HA is not as severe as a migraine, more annoying.  Her head hurts on top of her head on both sides.  Prior to 3 weeks ago a mild couple might happen a couple times a month.      She has tried tylenol and caffeine but not helpful.  She is S/P gastric bypass so cannot take ibuprofen.      She has a history of migraines but they have been very uncommon and can be treated with tylenol.  With these she gets nausea and photophobia.    Histories reviewed      Objective   /71   Pulse 97   Wt 97.5 kg (215 lb)   LMP 07/01/2024   SpO2 96%   BMI 39.97 kg/m²    Physical Exam    Alert adult, NAD, body wt overweight  Affect pleasant and appropriate  Eyes: PERRLA, EOMI, clear bilat  Nose clear, no sinus tenderness  Neck supple, No LAD, No thyromegaly  Heart RRR no murmur  Lungs CTA bilat  Skin warm dry and clear  Neuro grossly normal  Gait WNL    Reviewed previous labs    Problem List Items Addressed This Visit    None  Visit Diagnoses         Codes    Acute intractable tension-type headache    -  Primary G44.201    Relevant Medications    cyclobenzaprine (Flexeril) 10 mg tablet    omeprazole (PriLOSEC) 40 mg DR capsule    Abnormal TSH     R79.89    Relevant Orders    Thyroid Peroxidase (TPO) Antibody    Thyroid Stimulating Hormone    Thyroxine, Free    Triiodothyronine, Total     Amenorrhea     N91.2    Relevant Orders    Luteinizing hormone    FSH          Discussed likely etiology of the tension headache and possible treatment options.  We will try short course of prednisone along with Pepcid, she will use the Flexeril as needed.  Call if symptoms or not resolving.

## 2024-08-31 ENCOUNTER — LAB (OUTPATIENT)
Dept: LAB | Facility: LAB | Age: 34
End: 2024-08-31
Payer: COMMERCIAL

## 2024-08-31 DIAGNOSIS — N91.2 AMENORRHEA: ICD-10-CM

## 2024-08-31 DIAGNOSIS — R79.89 ABNORMAL TSH: ICD-10-CM

## 2024-08-31 LAB
FSH SERPL-ACNC: 5 IU/L
LH SERPL-ACNC: 7.1 IU/L
T3 SERPL-MCNC: 91 NG/DL (ref 60–200)
T4 FREE SERPL-MCNC: 1.26 NG/DL (ref 0.78–1.48)
THYROPEROXIDASE AB SERPL-ACNC: 34 IU/ML
TSH SERPL-ACNC: 2.62 MIU/L (ref 0.44–3.98)

## 2024-08-31 PROCEDURE — 84443 ASSAY THYROID STIM HORMONE: CPT

## 2024-08-31 PROCEDURE — 83001 ASSAY OF GONADOTROPIN (FSH): CPT

## 2024-08-31 PROCEDURE — 36415 COLL VENOUS BLD VENIPUNCTURE: CPT

## 2024-08-31 PROCEDURE — 86376 MICROSOMAL ANTIBODY EACH: CPT

## 2024-08-31 PROCEDURE — 84439 ASSAY OF FREE THYROXINE: CPT

## 2024-08-31 PROCEDURE — 83002 ASSAY OF GONADOTROPIN (LH): CPT

## 2024-08-31 PROCEDURE — 84480 ASSAY TRIIODOTHYRONINE (T3): CPT

## 2024-09-06 ENCOUNTER — APPOINTMENT (OUTPATIENT)
Dept: PRIMARY CARE | Facility: CLINIC | Age: 34
End: 2024-09-06
Payer: COMMERCIAL

## 2024-09-16 ENCOUNTER — APPOINTMENT (OUTPATIENT)
Dept: DERMATOLOGY | Facility: CLINIC | Age: 34
End: 2024-09-16
Payer: COMMERCIAL

## 2024-10-25 ENCOUNTER — OFFICE VISIT (OUTPATIENT)
Dept: PRIMARY CARE | Facility: CLINIC | Age: 34
End: 2024-10-25
Payer: COMMERCIAL

## 2024-10-25 VITALS
OXYGEN SATURATION: 98 % | WEIGHT: 218 LBS | DIASTOLIC BLOOD PRESSURE: 72 MMHG | HEART RATE: 74 BPM | BODY MASS INDEX: 40.52 KG/M2 | SYSTOLIC BLOOD PRESSURE: 107 MMHG

## 2024-10-25 DIAGNOSIS — T23.192D: Primary | ICD-10-CM

## 2024-10-25 DIAGNOSIS — G44.201 ACUTE INTRACTABLE TENSION-TYPE HEADACHE: ICD-10-CM

## 2024-10-25 PROCEDURE — 99213 OFFICE O/P EST LOW 20 MIN: CPT | Performed by: FAMILY MEDICINE

## 2024-10-25 PROCEDURE — 1036F TOBACCO NON-USER: CPT | Performed by: FAMILY MEDICINE

## 2024-10-25 RX ORDER — OXYCODONE AND ACETAMINOPHEN 5; 325 MG/1; MG/1
1 TABLET ORAL EVERY 6 HOURS PRN
Qty: 12 TABLET | Refills: 0 | Status: SHIPPED | OUTPATIENT
Start: 2024-10-25 | End: 2024-11-01

## 2024-10-25 RX ORDER — OMEPRAZOLE 40 MG/1
40 CAPSULE, DELAYED RELEASE ORAL
Qty: 30 CAPSULE | Refills: 0 | Status: SHIPPED | OUTPATIENT
Start: 2024-10-25 | End: 2024-11-24

## 2024-12-10 ENCOUNTER — APPOINTMENT (OUTPATIENT)
Dept: ENDOCRINOLOGY | Facility: CLINIC | Age: 34
End: 2024-12-10
Payer: COMMERCIAL

## 2024-12-11 ENCOUNTER — OFFICE VISIT (OUTPATIENT)
Dept: PRIMARY CARE | Facility: CLINIC | Age: 34
End: 2024-12-11
Payer: COMMERCIAL

## 2024-12-11 VITALS
DIASTOLIC BLOOD PRESSURE: 73 MMHG | HEART RATE: 104 BPM | SYSTOLIC BLOOD PRESSURE: 107 MMHG | WEIGHT: 219 LBS | BODY MASS INDEX: 40.71 KG/M2 | OXYGEN SATURATION: 97 %

## 2024-12-11 DIAGNOSIS — B02.9 HERPES ZOSTER WITHOUT COMPLICATION: Primary | ICD-10-CM

## 2024-12-11 PROCEDURE — 99213 OFFICE O/P EST LOW 20 MIN: CPT

## 2024-12-11 PROCEDURE — 1036F TOBACCO NON-USER: CPT

## 2024-12-11 RX ORDER — FERROUS SULFATE 325(65) MG
TABLET ORAL
COMMUNITY
Start: 2024-11-24

## 2024-12-11 RX ORDER — VALACYCLOVIR HYDROCHLORIDE 1 G/1
1000 TABLET, FILM COATED ORAL 3 TIMES DAILY
Qty: 21 TABLET | Refills: 0 | Status: SHIPPED | OUTPATIENT
Start: 2024-12-11 | End: 2024-12-18

## 2024-12-11 ASSESSMENT — ENCOUNTER SYMPTOMS
FATIGUE: 0
DIARRHEA: 0
SORE THROAT: 0
RHINORRHEA: 0
SHORTNESS OF BREATH: 0
ANOREXIA: 0
VOMITING: 0
COUGH: 0
FEVER: 0
CHILLS: 0
EYE PAIN: 0
WEAKNESS: 0
NAIL CHANGES: 0
ARTHRALGIAS: 1

## 2024-12-11 NOTE — PROGRESS NOTES
Subjective   Patient ID: Haley Drake is a 34 y.o. female who presents for Leg Pain (Left leg pain for 1 week with rash. ).    Rash  This is a new problem. The current episode started yesterday. The problem is unchanged. The affected locations include the left leg. The rash is characterized by blistering. She was exposed to nothing. Associated symptoms include joint pain. Pertinent negatives include no anorexia, congestion, cough, diarrhea, eye pain, facial edema, fatigue, fever, nail changes, rhinorrhea, shortness of breath, sore throat or vomiting.      Left leg pain x 1 weeks   Noticed rash yesterday, appears as blisters  Took tylenol/muscle relaxer with no relief     Review of Systems   Constitutional:  Negative for chills, fatigue and fever.   HENT:  Negative for congestion, rhinorrhea and sore throat.    Eyes:  Negative for pain.   Respiratory:  Negative for cough and shortness of breath.    Gastrointestinal:  Negative for anorexia, diarrhea and vomiting.   Musculoskeletal:  Positive for arthralgias (Left leg) and joint pain.   Skin:  Positive for rash. Negative for nail changes.   Neurological:  Negative for weakness.       Objective   /73   Pulse 104   Wt 99.3 kg (219 lb)   SpO2 97%   BMI 40.71 kg/m²     Physical Exam  Constitutional:       Appearance: Normal appearance.   HENT:      Head: Normocephalic and atraumatic.   Eyes:      Extraocular Movements: Extraocular movements intact.      Conjunctiva/sclera: Conjunctivae normal.      Pupils: Pupils are equal, round, and reactive to light.   Cardiovascular:      Rate and Rhythm: Normal rate and regular rhythm.      Pulses: Normal pulses.      Heart sounds: Normal heart sounds.   Musculoskeletal:         General: Tenderness (Left thigh) present. Normal range of motion.   Skin:     General: Skin is warm.      Findings: Rash (Left lower leg (Papular)) present.   Neurological:      General: No focal deficit present.      Mental Status: She is alert  and oriented to person, place, and time.       Discussed with patient that this could likely be shingles. Starting on Valtrex and recommending continuing tylenol as needed. If no improvement then she will follow back up in the office. Discussed if she is to develop worsening symptoms then she is to proceed to the ER for further evaluation.     Assessment/Plan   Problem List Items Addressed This Visit             ICD-10-CM    Herpes zoster without complication - Primary B02.9    Relevant Medications    valACYclovir (Valtrex) 1 gram tablet

## 2024-12-13 RX ORDER — GABAPENTIN 300 MG/1
CAPSULE ORAL
Qty: 6 CAPSULE | Refills: 0 | Status: SHIPPED | OUTPATIENT
Start: 2024-12-13

## 2024-12-13 NOTE — PROGRESS NOTES
Patient called in stating that the rash has continued to spread and still having some pain. I discussed with patient that we can trial Gabapentin, however if no improvement tomorrow then I recommended she proceed to the ER just to rule out additional causes. The rash at the time of her visit was following the dermatome and I had no concerns for infectious etiology, as patient was afebrile.

## 2024-12-16 ENCOUNTER — APPOINTMENT (OUTPATIENT)
Dept: PRIMARY CARE | Facility: CLINIC | Age: 34
End: 2024-12-16
Payer: COMMERCIAL

## 2024-12-17 ENCOUNTER — PATIENT MESSAGE (OUTPATIENT)
Dept: PRIMARY CARE | Facility: CLINIC | Age: 34
End: 2024-12-17

## 2024-12-17 ENCOUNTER — OFFICE VISIT (OUTPATIENT)
Dept: PRIMARY CARE | Facility: CLINIC | Age: 34
End: 2024-12-17
Payer: COMMERCIAL

## 2024-12-17 VITALS
DIASTOLIC BLOOD PRESSURE: 71 MMHG | WEIGHT: 222 LBS | BODY MASS INDEX: 41.27 KG/M2 | OXYGEN SATURATION: 98 % | SYSTOLIC BLOOD PRESSURE: 109 MMHG | HEART RATE: 77 BPM

## 2024-12-17 DIAGNOSIS — B02.9 HERPES ZOSTER WITHOUT COMPLICATION: Primary | ICD-10-CM

## 2024-12-17 PROCEDURE — 1036F TOBACCO NON-USER: CPT | Performed by: FAMILY MEDICINE

## 2024-12-17 PROCEDURE — 99213 OFFICE O/P EST LOW 20 MIN: CPT | Performed by: FAMILY MEDICINE

## 2024-12-17 RX ORDER — OXYCODONE AND ACETAMINOPHEN 5; 325 MG/1; MG/1
1 TABLET ORAL EVERY 8 HOURS PRN
Qty: 21 TABLET | Refills: 0 | Status: SHIPPED | OUTPATIENT
Start: 2024-12-17 | End: 2024-12-24

## 2024-12-17 RX ORDER — GABAPENTIN 300 MG/1
300 CAPSULE ORAL 3 TIMES DAILY
Qty: 90 CAPSULE | Refills: 1 | Status: SHIPPED | OUTPATIENT
Start: 2024-12-17 | End: 2025-02-15

## 2024-12-18 NOTE — ASSESSMENT & PLAN NOTE
Reviewed tyx options, what to expect, how to gradually ramp the gabapentin dose  Orders:    gabapentin (Neurontin) 300 mg capsule; Take 1 capsule (300 mg) by mouth 3 times a day.    oxyCODONE-acetaminophen (Percocet) 5-325 mg tablet; Take 1 tablet by mouth every 8 hours if needed for severe pain (7 - 10) for up to 7 days.

## 2024-12-18 NOTE — PROGRESS NOTES
Subjective   Patient ID: Haley Drake is a 34 y.o. female who presents for shingles pain (Patient was diagnosed 1 week ago with shingles. She said the gabapentin worked for 2 days then not at all and she has been in extreme pain since. ).    Somehow her Rx for gabapentin was erroneously written for 6 pills, so she though she was supposed to stop it after 3 days.  She has a lot of pain still, worse at night due to difficulty with sleeping positions (shingles on medical aspect of left leg).     Histories reviewed      Objective   /71   Pulse 77   Wt 101 kg (222 lb)   LMP 11/26/2024   SpO2 98%   BMI 41.27 kg/m²    Physical Exam    Alert adult, NAD  Affect pleasant  Heart RRR no murmur  Lungs CTA bilat  Rash medial aspect of left lower leg, c/w healing zoster  Assessment & Plan  Herpes zoster without complication  Reviewed tyx options, what to expect, how to gradually ramp the gabapentin dose  Orders:    gabapentin (Neurontin) 300 mg capsule; Take 1 capsule (300 mg) by mouth 3 times a day.    oxyCODONE-acetaminophen (Percocet) 5-325 mg tablet; Take 1 tablet by mouth every 8 hours if needed for severe pain (7 - 10) for up to 7 days.

## 2024-12-23 ENCOUNTER — APPOINTMENT (OUTPATIENT)
Dept: PRIMARY CARE | Facility: CLINIC | Age: 34
End: 2024-12-23
Payer: COMMERCIAL

## 2025-01-17 ENCOUNTER — PATIENT MESSAGE (OUTPATIENT)
Dept: PRIMARY CARE | Facility: CLINIC | Age: 35
End: 2025-01-17
Payer: COMMERCIAL

## 2025-01-22 ENCOUNTER — APPOINTMENT (OUTPATIENT)
Dept: PRIMARY CARE | Facility: CLINIC | Age: 35
End: 2025-01-22
Payer: COMMERCIAL

## 2025-01-22 VITALS
HEART RATE: 94 BPM | WEIGHT: 221 LBS | SYSTOLIC BLOOD PRESSURE: 120 MMHG | OXYGEN SATURATION: 99 % | BODY MASS INDEX: 41.08 KG/M2 | DIASTOLIC BLOOD PRESSURE: 62 MMHG

## 2025-01-22 DIAGNOSIS — R11.0 NAUSEA: ICD-10-CM

## 2025-01-22 DIAGNOSIS — B02.23 NEUROPATHY DUE TO HERPES ZOSTER: Primary | ICD-10-CM

## 2025-01-22 PROCEDURE — 99213 OFFICE O/P EST LOW 20 MIN: CPT | Performed by: FAMILY MEDICINE

## 2025-01-22 PROCEDURE — 1036F TOBACCO NON-USER: CPT | Performed by: FAMILY MEDICINE

## 2025-01-22 RX ORDER — ONDANSETRON HYDROCHLORIDE 8 MG/1
8 TABLET, FILM COATED ORAL EVERY 8 HOURS PRN
Qty: 20 TABLET | Refills: 0 | Status: SHIPPED | OUTPATIENT
Start: 2025-01-22

## 2025-01-22 RX ORDER — GABAPENTIN 100 MG/1
CAPSULE ORAL
Qty: 180 CAPSULE | Refills: 1 | Status: SHIPPED | OUTPATIENT
Start: 2025-01-22

## 2025-01-22 RX ORDER — AMITRIPTYLINE HYDROCHLORIDE 10 MG/1
10-20 TABLET, FILM COATED ORAL NIGHTLY
Qty: 60 TABLET | Refills: 2 | Status: SHIPPED | OUTPATIENT
Start: 2025-01-22 | End: 2026-01-22

## 2025-01-22 ASSESSMENT — PATIENT HEALTH QUESTIONNAIRE - PHQ9
1. LITTLE INTEREST OR PLEASURE IN DOING THINGS: NOT AT ALL
SUM OF ALL RESPONSES TO PHQ9 QUESTIONS 1 AND 2: 0
2. FEELING DOWN, DEPRESSED OR HOPELESS: NOT AT ALL

## 2025-01-22 NOTE — PROGRESS NOTES
Subjective   Patient ID: Haley Drake is a 34 y.o. female who presents for Follow-up (Shingles dx beginning of December, Rash is gone just continuing to have pain, on left leg. Having some nausea this is new in the last 2 days.).    She continues to have pain, mainly in her left thigh.  The pain in her sacrum has improved.  She is taking gabapentin 300mg tid, but it only decreases the pain to 50%.  She can work, but is often uncomfortable.      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    Nausea    Orders:    ondansetron (Zofran) 8 mg tablet; Take 1 tablet (8 mg) by mouth every 8 hours if needed for nausea or vomiting.

## 2025-01-22 NOTE — LETTER
January 22, 2025     Patient: Haley Drake   YOB: 1990   Date of Visit: 1/22/2025       To Whom It May Concern:    Haley Drake was seen in my clinic on 1/22/2025 at 10:00 am. Please excuse Haley for her absence from work on this day to make the appointment.  Please also excuse her from work 1/20/25 and 1/21/25.  If her symptoms resolve, she may return to work on 1/23/25 without restrictions    If you have any questions or concerns, please don't hesitate to call.         Sincerely,         Zainab Haywood MD        CC: No Recipients

## 2025-03-04 ASSESSMENT — PROMIS GLOBAL HEALTH SCALE
CARRYOUT_SOCIAL_ACTIVITIES: FAIR
RATE_SOCIAL_SATISFACTION: FAIR
RATE_GENERAL_HEALTH: FAIR
EMOTIONAL_PROBLEMS: ALWAYS
RATE_AVERAGE_FATIGUE: SEVERE
RATE_QUALITY_OF_LIFE: FAIR
RATE_AVERAGE_PAIN: 4
RATE_MENTAL_HEALTH: FAIR
RATE_PHYSICAL_HEALTH: FAIR
CARRYOUT_PHYSICAL_ACTIVITIES: COMPLETELY

## 2025-03-10 ENCOUNTER — PATIENT MESSAGE (OUTPATIENT)
Dept: SURGERY | Facility: CLINIC | Age: 35
End: 2025-03-10
Payer: COMMERCIAL

## 2025-03-10 ENCOUNTER — TELEPHONE (OUTPATIENT)
Dept: PRIMARY CARE | Facility: CLINIC | Age: 35
End: 2025-03-10
Payer: COMMERCIAL

## 2025-03-10 DIAGNOSIS — K91.2 POSTOPERATIVE MALABSORPTION (HHS-HCC): Primary | ICD-10-CM

## 2025-03-10 DIAGNOSIS — J10.1 INFLUENZA A: Primary | ICD-10-CM

## 2025-03-10 RX ORDER — ASCORBIC ACID 250 MG
250 TABLET ORAL 2 TIMES DAILY
Qty: 180 TABLET | Refills: 0 | Status: SHIPPED | OUTPATIENT
Start: 2025-03-10 | End: 2025-06-08

## 2025-03-10 RX ORDER — FERROUS SULFATE 325(65) MG
325 TABLET ORAL
Qty: 180 TABLET | Refills: 0 | Status: SHIPPED | OUTPATIENT
Start: 2025-03-10 | End: 2025-06-08

## 2025-03-10 RX ORDER — BENZONATATE 200 MG/1
200 CAPSULE ORAL 3 TIMES DAILY PRN
Qty: 42 CAPSULE | Refills: 0 | Status: SHIPPED | OUTPATIENT
Start: 2025-03-10 | End: 2025-04-09

## 2025-03-10 NOTE — TELEPHONE ENCOUNTER
Pt called regarding med request. Pt is requesting Benzonatate and Mucinex since being diagnosed with Influenza A as of today 3.10.25. Next appt 3.31.25. Wellness.

## 2025-03-11 ENCOUNTER — APPOINTMENT (OUTPATIENT)
Dept: PRIMARY CARE | Facility: CLINIC | Age: 35
End: 2025-03-11
Payer: COMMERCIAL

## 2025-03-31 ENCOUNTER — APPOINTMENT (OUTPATIENT)
Dept: PRIMARY CARE | Facility: CLINIC | Age: 35
End: 2025-03-31
Payer: COMMERCIAL

## 2025-03-31 VITALS
BODY MASS INDEX: 40.12 KG/M2 | WEIGHT: 218 LBS | HEIGHT: 62 IN | HEART RATE: 74 BPM | DIASTOLIC BLOOD PRESSURE: 62 MMHG | SYSTOLIC BLOOD PRESSURE: 114 MMHG | OXYGEN SATURATION: 99 %

## 2025-03-31 DIAGNOSIS — F41.9 ANXIETY: ICD-10-CM

## 2025-03-31 DIAGNOSIS — B02.23 NEUROPATHY DUE TO HERPES ZOSTER: ICD-10-CM

## 2025-03-31 DIAGNOSIS — R00.2 HEART PALPITATIONS: ICD-10-CM

## 2025-03-31 DIAGNOSIS — R51.9 BENIGN HEADACHE: ICD-10-CM

## 2025-03-31 DIAGNOSIS — R11.0 NAUSEA: ICD-10-CM

## 2025-03-31 DIAGNOSIS — Z00.00 WELL ADULT EXAM: Primary | ICD-10-CM

## 2025-03-31 DIAGNOSIS — R53.83 OTHER FATIGUE: ICD-10-CM

## 2025-03-31 PROCEDURE — 99214 OFFICE O/P EST MOD 30 MIN: CPT | Performed by: FAMILY MEDICINE

## 2025-03-31 PROCEDURE — 99395 PREV VISIT EST AGE 18-39: CPT | Performed by: FAMILY MEDICINE

## 2025-03-31 PROCEDURE — 1036F TOBACCO NON-USER: CPT | Performed by: FAMILY MEDICINE

## 2025-03-31 PROCEDURE — 3008F BODY MASS INDEX DOCD: CPT | Performed by: FAMILY MEDICINE

## 2025-03-31 RX ORDER — SERTRALINE HYDROCHLORIDE 50 MG/1
50 TABLET, FILM COATED ORAL DAILY
Qty: 90 TABLET | Refills: 1 | Status: SHIPPED | OUTPATIENT
Start: 2025-03-31 | End: 2026-03-31

## 2025-06-05 ENCOUNTER — APPOINTMENT (OUTPATIENT)
Dept: PRIMARY CARE | Facility: CLINIC | Age: 35
End: 2025-06-05
Payer: COMMERCIAL

## 2025-06-06 DIAGNOSIS — K91.2 POSTOPERATIVE MALABSORPTION (HHS-HCC): ICD-10-CM

## 2025-06-06 RX ORDER — ASCORBIC ACID 250 MG
250 TABLET ORAL 2 TIMES DAILY
Qty: 180 TABLET | Refills: 0 | Status: SHIPPED | OUTPATIENT
Start: 2025-06-06 | End: 2025-09-04

## 2025-06-27 ENCOUNTER — APPOINTMENT (OUTPATIENT)
Dept: PRIMARY CARE | Facility: CLINIC | Age: 35
End: 2025-06-27
Payer: COMMERCIAL

## 2025-06-30 ENCOUNTER — APPOINTMENT (OUTPATIENT)
Dept: PRIMARY CARE | Facility: CLINIC | Age: 35
End: 2025-06-30
Payer: COMMERCIAL

## 2025-07-14 ENCOUNTER — APPOINTMENT (OUTPATIENT)
Dept: PRIMARY CARE | Facility: CLINIC | Age: 35
End: 2025-07-14
Payer: COMMERCIAL

## 2025-08-08 ENCOUNTER — TELEMEDICINE (OUTPATIENT)
Dept: SURGERY | Facility: CLINIC | Age: 35
End: 2025-08-08
Payer: COMMERCIAL

## 2025-08-08 VITALS — BODY MASS INDEX: 39.93 KG/M2 | HEIGHT: 62 IN | WEIGHT: 217 LBS

## 2025-08-08 DIAGNOSIS — G47.33 OBSTRUCTIVE SLEEP APNEA, ADULT: ICD-10-CM

## 2025-08-08 DIAGNOSIS — K91.2 POSTOPERATIVE MALABSORPTION (HHS-HCC): Primary | ICD-10-CM

## 2025-08-08 DIAGNOSIS — Z98.84 S/P GASTRIC BYPASS: ICD-10-CM

## 2025-08-08 DIAGNOSIS — K21.9 GERD WITHOUT ESOPHAGITIS: ICD-10-CM

## 2025-08-08 PROCEDURE — 99214 OFFICE O/P EST MOD 30 MIN: CPT | Mod: GT,U1,95

## 2025-08-08 NOTE — ASSESSMENT & PLAN NOTE
- denies any new s/sx following bariatric surgery  - she no longer needs PPI omeprazole and has completed taper off  - reviewed antiGERD diet

## 2025-08-08 NOTE — ASSESSMENT & PLAN NOTE
35 y.o. female   S/p RYGB 07/2021, now 4 year post-op   lbs   Pre-op visit 339 lbs      No acute post bariatric surgery complications, current.   No longer having issue with chronic diarrhea. Reports regular BM. Established with GI.   Tolerating diet with appropriate protein and fluid intake   Taking appropriate supplements  No PPI     Plan:  -Continue to follow protein forward, reduced calorie, whole foods based diet, follow diet direction of bariatric RD. Protein supplement/shakes as needed. [place] diet encouraged to minimize symptoms if recurrence diarrhea.   -Continue to participate in regular exercise with goal to achieve 200-300 minutes per week of aerobic & resistance training for preservation of lean body mass.  -Continue multivitamin and supplements to support micronutrient needs  -Join Support Groups  -No ongoing need for anti reflux medications.  -Labs - see orders  FU- now annual sooner if needed.

## 2025-08-08 NOTE — ASSESSMENT & PLAN NOTE
Annual orders submitted for completion following today's visit.   Adjust micronutrient supplementation per results  Continue recommended post bariatric surgery supplements

## 2025-08-08 NOTE — ASSESSMENT & PLAN NOTE
- continue to follow up with sleep med as needed  - continue to wear CPAP or BiPAP if ordered at prescribed settings  - reviewed practices to promote adequate sleep hygiene.

## 2025-08-08 NOTE — PROGRESS NOTES
BARIATRIC SURGERY CLINIC  Comprehensive Weight Management        Name: Haley Drake  MRN: [id number]     Index Surgery  Date of Surgery: 7/7/2021   Surgeon: Davon                         Surgical Procedure: Laparoscopic mariana en y gastric bypass 47400    Virtual or Telephone Consent  An interactive audio and video telecommunication system which permits real time communications between the patient (at the originating site) and provider (at the distant site) was utilized to provide this telehealth service.   Verbal consent was requested and obtained from Haley Drake on this date, 08/08/25 for a telehealth visit and the patient's location was confirmed at the time of the visit.    HPI:   Haley Drake is a 35 y.o. female presenting for follow up comprehensive weight management evaluation. She is now four years s/p RYGB. December 2023 was problem focused for potential dumping syndrome following acute gastroenteritis. At the time she was treated by PCP with supportive care, zofran PRN for nausea. Overall symptoms improved however within 30-45 minutes of eating she is experiencing frequent diarrhea - occurs with solids/proteins more often than other foods. No melena, bloody or mucous in diarrhea. Prior impression- she is stable to stay hydrated orally.  She has been using broth proteins and protein shakes without symptoms, avoids lactose.  If she eats yogurt she can get diarrhea but inconsistent. She is established with GI for ongoing fu. No abdominal pain, just cramping prior to BM. No fever, chills, nausea, vomiting.  Today's visit for ongoing evaluation of weight loss maintenance and micronutrient management.      Diet:  - Stage: regular food diet  - Achieving protein and fluid goals: yes    Exercise:  - walking 1 hr / day  - yoga 4-5x / week     Concerns related to:  Nausea/Vomiting, Reflux: denies  Abdominal Pain: denies  Diarrhea/Constipation- bowel function is regular, cleared by GI. No longer reports  issue with diarrhea (since 2024).     DAILY SUPPLEMENTS:  Calcium: Calcium Citrate w/ vitamin D (1200 - 1500mg)  Multivitamin & Minerals: Bariataric Pal  Vitamin B12: MVI   Vitamin D3: in calcium  Iron/Other: Biotin  PPI:n/a    Primary Medical Hx:  Patient Active Problem List   Diagnosis    Recurrent UTI    S/P gastric bypass    Cervical intraepithelial neoplasia grade 2    Chronic insomnia    Chronic midline low back pain without sciatica    Generalized anxiety disorder    GERD without esophagitis    Migraine without aura, not refractory    Obstructive sleep apnea, adult    PCOS (polycystic ovarian syndrome)    Diarrhea    Postoperative malabsorption ([facility])    Herpes zoster without complication      Past Surgical History:   Procedure Laterality Date     SECTION, LOW TRANSVERSE      GASTRIC BYPASS      HERNIA REPAIR      OTHER SURGICAL HISTORY  2019     section    OTHER SURGICAL HISTORY  2020    Oophorectomy      Social History     Socioeconomic History    Marital status: Single     Spouse name: Not on file    Number of children: Not on file    Years of education: Not on file    Highest education level: Not on file   Occupational History    Not on file   Tobacco Use    Smoking status: Never    Smokeless tobacco: Never   Vaping Use    Vaping status: Never Used   Substance and Sexual Activity    Alcohol use: Not Currently     Comment: sometimes    Drug use: Never    Sexual activity: Yes     Partners: Male     Birth control/protection: Condom Male   Other Topics Concern    Not on file   Social History Narrative    Not on file     Social Drivers of Health     Financial Resource Strain: Medium Risk (6/10/2025)    Received from [facility]    Overall Financial Resource Strain (CARDIA)     Difficulty of Paying Living Expenses: Somewhat hard   Food Insecurity: Food Insecurity Present (6/10/2025)    Received from [facility]    Hunger Vital Sign     Within the past 12 months,  you worried that your food would run out before you got the money to buy more.: Often true     Within the past 12 months, the food you bought just didn't last and you didn't have money to get more.: Often true   Transportation Needs: Unmet Transportation Needs (6/10/2025)    Received from [facility]    PRAPARE - Transportation     Lack of Transportation (Medical): Yes     Lack of Transportation (Non-Medical): No   Physical Activity: Sufficiently Active (6/10/2025)    Received from [facility]    Exercise Vital Sign     On average, how many days per week do you engage in moderate to strenuous exercise (like a brisk walk)?: 5 days     On average, how many minutes do you engage in exercise at this level?: 60 min   Stress: Stress Concern Present (6/10/2025)    Received from [facility]    [employer] [place] [employer] - Occupational Stress Questionnaire     Feeling of Stress : Very much   Social Connections: Socially Isolated (6/10/2025)    Received from [facility]    Social Connection and Isolation Panel     In a typical week, how many times do you talk on the phone with family, friends, or neighbors?: More than three times a week     How often do you get together with friends or relatives?: Never     How often do you attend Evangelical or Orthodoxy services?: Never     Do you belong to any clubs or organizations such as Evangelical groups, unions, fraternal or athletic groups, or school groups?: No     How often do you attend meetings of the clubs or organizations you belong to?: Never     Are you , , , , never , or living with a partner?: Never    Intimate Partner Violence: Not on file   Housing Stability: High Risk (6/10/2025)    Received from [facility]    Housing Stability Vital Sign     In the last 12 months, was there a time when you were not able to pay the mortgage or rent on time?: Yes     Number of Times Moved in the Last Year: Not on  "file     Homeless in the Last Year: Not on file     Family History   Problem Relation Name Age of Onset    Asthma Mother Carol     Blood clot Mother Carol     Depression Mother Carol     Mental illness Mother Carol     Cancer Mother's Sister Carole     Diabetes Maternal Grandfather Stephan     Cancer Mother's Sister Carole     Cancer Mother's Sister Carole       Current Outpatient Medications on File Prior to Visit   Medication Sig Dispense Refill    albuterol 90 mcg/actuation inhaler Inhale.      ascorbic acid (Vitamin C) 250 mg tablet TAKE 1 TABLET (250 MG) BY MOUTH 2 TIMES A DAY. WITH IRON. 180 tablet 0    bacillus coagulans-inulin 1 billion-250 cell-mg capsule Take 1 capsule by mouth once daily.      calcium citrate-vitamin D3 200 mg-6.25 mcg (250 unit) tablet Take by mouth.      cyclobenzaprine (Flexeril) 10 mg tablet Take 1 tablet (10 mg) by mouth 3 times a day as needed for muscle spasms. 20 tablet 0    ondansetron (Zofran) 8 mg tablet Take 1 tablet (8 mg) by mouth every 8 hours if needed for nausea or vomiting. 20 tablet 0    [DISCONTINUED] amitriptyline (Elavil) 10 mg tablet Take 1-2 tablets (10-20 mg) by mouth once daily at bedtime. 60 tablet 2    [DISCONTINUED] levonorgestrel (Mirena) 21 mcg/24 hours (8 yrs) 52 mg IUD by intrauterine route.      [DISCONTINUED] omeprazole (PriLOSEC) 40 mg DR capsule Take 1 capsule (40 mg) by mouth once daily in the morning. Take before meals. Do not crush or chew. 30 capsule 0    [DISCONTINUED] sertraline (Zoloft) 50 mg tablet Take 1 tablet (50 mg) by mouth once daily. 90 tablet 1     No current facility-administered medications on file prior to visit.      No Known Allergies     REVIEW OF SYSTEMS:  PHYSICAL EXAM  HT: 5'1.5\" Wt: 217 lbs  BMI: 39.69  Negative unless otherwise reviewed in HPI.  Alert, well appearing, no acute distress, nourished, hydrated.  Anicteric sclera, no ptosis  Facial symmetry  Neck supple  Unlabored respirations.  Easily conversant without increased respiratory " effort  Oriented to person, place, time.  Judgement intact.  Appropriate mood, affect.      ASSESSMENT & PLAN:  35 y.o. female presenting today for follow up comprehensive weight management evaluation. She is now four years s/p RYGB. Overall doing well without acute or surgically related issue. She had SIBO prior, follows with GI specialist. She reports mostly regular BM. Denies abdominal pain, n, v, reflux. Taking appropriate vitamins and supplements, achieving protein and fluid goals. Annual micronutrient lab orders submitted for fu. She is doing regular exercise, walking 2 miles/day. Overall reports energy is good, sleep improved with weight loss maintenance. Follow up is now annual, sooner if needed.      Weight Impression:  IW: 360 lbs   Prior Wt: 215 lbs (01/2024)  Current Weight: 217 lbs   % Total Weight Loss: -39.72 %      Problem List Items Addressed This Visit       S/P gastric bypass    35 y.o. female   S/p RYGB 07/2021, now 4 year post-op   lbs   Pre-op visit 339 lbs      No acute post bariatric surgery complications, current.   No longer having issue with chronic diarrhea. Reports regular BM. Established with GI.   Tolerating diet with appropriate protein and fluid intake   Taking appropriate supplements  No PPI     Plan:  -Continue to follow protein forward, reduced calorie, whole foods based diet, follow diet direction of bariatric RD. Protein supplement/shakes as needed. [place] diet encouraged to minimize symptoms if recurrence diarrhea.   -Continue to participate in regular exercise with goal to achieve 200-300 minutes per week of aerobic & resistance training for preservation of lean body mass.  -Continue multivitamin and supplements to support micronutrient needs  -Join Support Groups  -No ongoing need for anti reflux medications.  -Labs - see orders  FU- now annual sooner if needed.         Relevant Orders    Lipid Panel    Comprehensive Metabolic Panel    CBC    GERD without esophagitis     - denies any new s/sx following bariatric surgery  - she no longer needs PPI omeprazole and has completed taper off  - reviewed antiGERD diet         Relevant Orders    Comprehensive Metabolic Panel    CBC    Obstructive sleep apnea, adult    - continue to follow up with sleep med as needed  - continue to wear CPAP or BiPAP if ordered at prescribed settings  - reviewed practices to promote adequate sleep hygiene.         Relevant Orders    Lipid Panel    Comprehensive Metabolic Panel    CBC    Postoperative malabsorption ([facility]) - Primary    Annual orders submitted for completion following today's visit.   Adjust micronutrient supplementation per results  Continue recommended post bariatric surgery supplements              Relevant Orders    Zinc, Serum or Plasma    Vitamin B12    Vitamin A    Vitamin K    TSH with reflex to Free T4 if abnormal    Ferritin    Vitamin B6    Iron and TIBC    Vitamin D 25-Hydroxy,Total (for eval of Vitamin D levels)    Parathyroid Hormone, Intact    Vitamin B1, Whole Blood    Folate    Copper, Blood   Please see Patient Instructions for today's relevant referrals, orders and instructions. Preventative health counseling completed face to face for duration of fifteen minutes.      Follow Up: Follow up in about 1 year (around 8/8/2026).     Shyla Max, APRN-CNP

## 2025-08-08 NOTE — PATIENT INSTRUCTIONS
"The following are the recommendations we discussed at your appointment today:  1. Nutrition  - Please make sure you are seeing the bariatric dietitian regularly.    Dietitian Information:   Mena Davis: [phone]  [facility] [place] [phone]    Your Protein Goals will gradually increase as you get further out from surgery:  0-3 months - 60 GRAMS PER DAY  3-6 months - 60-75 GRAMS PER DAY  6-12 months - 75-90 GRAMS PER DAY  12+ months - 80-90 GRAMS PER DAY    - Follow Pouch Rules;.   Stop drinking 30 minutes before your meals, Take 30 minutes to eat your meal   - NO DRINKING DURING MEALS, Wait for 30 minutes after your meal to drink  - Eat 5 servings of fruits and veggies daily.  A serving is 1 small (tennis ball size) piece of whole fruit, 1/2 cup fresh fruit, 1 cup non starchy vegetables  - Eat 3 small meals and 1-2 healthy, protein rich, snacks per day.    EAT PROTEIN FIRST AT MEALS, 2nd non starchy vegetable or fruit serving, consume starches last and aim for whole grains of small portion.  This pattern of eating ensures you are getting full on high quality protein and higher fiber foods with many vitamins and minerals to support adequate nutrition first.      2. Exercise Recommendations:    Regular physical activity is CRITICAL for long term successful weight management.    Strive for a minimum weekly exercise goal of at least 200 minutes per week of aerobic exercise (45 minutes at least 5 days per week or 30 minutes daily)    Strength based resistance training is critical for helping to maintain and build lean body mass/muscle mass which is very important for long term health.  Having higher muscle mass is associated with better health outcomes and can help to maintain higher calorie expenditure.      Designing a Strength Program:  Select 3-4 exercises that target different major muscle groups.  - Emphasize the following movements \"pull, push, squat, hip hinge\"  \"Pull\" examples - pull up, " "bent over row or dumbbell row, pull down with weight bar or resistance band  \"Push\" examples - push up, bench press, chest flys, dips, overhead press, dumbbell bench press  \"Squat\" examples - air squat, chair squat, lunge steps, goblet squat, front squat, etc  \"Hip hinge\" examples - kettle bell swing, good morning, glute bridge, deadlift, suitcase pick ups, hip thrust    Perform two to three sets of eight to 15 repetitions of each exercise.  Try to use a resistance that feels like an \"8 out of 10\" effort, with 10 being the highest effort you can give.    To get stronger, try increasing either the weight, number of repetitions, number of sets or number of exercises every 4-8 weeks as you feel more comfortable with your current program.      3. Fluids  - Drink at least 60 oz of water every day.   - Wait 30 minutes before or after meals to drink fluids.  - Avoid carbonated beverages.    4. Vitamins  - Remember to take your multivitamins 2 times daily, once in the morning and once in the evening  - Take your calcium 2-3 times daily, at least 2 hours apart from the multivitamin - total daily dose at least 1200-1500mg per day.    - Vitamin D3 3000 units per day  - B12 - depending on your blood work and how well you absorb B12 from your multivitamin you may need additional B12 of 350-500mcg oral per day.    5. Labs  - We will check labs today and results will be communicated via [facility] My Chart  - A copy of the results can be viewed on  My Chart.      Follow-up with Dietitian for bariatric diet optimization  Follow-up with PCP for general health questions and ongoing management of routine health concerns      "

## 2025-08-16 LAB
25(OH)D3+25(OH)D2 SERPL-MCNC: 48 NG/ML (ref 30–100)
ALBUMIN SERPL-MCNC: 4.1 G/DL (ref 3.6–5.1)
ALP SERPL-CCNC: 67 U/L (ref 31–125)
ALT SERPL-CCNC: 19 U/L (ref 6–29)
ANION GAP SERPL CALCULATED.4IONS-SCNC: 8 MMOL/L (CALC) (ref 7–17)
AST SERPL-CCNC: 20 U/L (ref 10–30)
BILIRUB SERPL-MCNC: 0.5 MG/DL (ref 0.2–1.2)
BUN SERPL-MCNC: 10 MG/DL (ref 7–25)
CALCIUM SERPL-MCNC: 8.9 MG/DL (ref 8.6–10.2)
CHLORIDE SERPL-SCNC: 106 MMOL/L (ref 98–110)
CHOLEST SERPL-MCNC: 178 MG/DL
CHOLEST/HDLC SERPL: 3.3 (CALC)
CO2 SERPL-SCNC: 25 MMOL/L (ref 20–32)
COPPER BLD-MCNC: 97 MCG/DL
CREAT SERPL-MCNC: 0.83 MG/DL (ref 0.5–0.97)
EGFRCR SERPLBLD CKD-EPI 2021: 94 ML/MIN/1.73M2
ERYTHROCYTE [DISTWIDTH] IN BLOOD BY AUTOMATED COUNT: 12.3 % (ref 11–15)
FERRITIN SERPL-MCNC: 69 NG/ML (ref 16–154)
FOLATE SERPL-MCNC: 12.4 NG/ML
GLUCOSE SERPL-MCNC: 106 MG/DL (ref 65–99)
HCT VFR BLD AUTO: 42.4 % (ref 35–45)
HDLC SERPL-MCNC: 54 MG/DL
HGB BLD-MCNC: 14.1 G/DL (ref 11.7–15.5)
IRON SATN MFR SERPL: 30 % (CALC) (ref 16–45)
IRON SERPL-MCNC: 96 MCG/DL (ref 40–190)
LDLC SERPL CALC-MCNC: 107 MG/DL (CALC)
MCH RBC QN AUTO: 30.7 PG (ref 27–33)
MCHC RBC AUTO-ENTMCNC: 33.3 G/DL (ref 32–36)
MCV RBC AUTO: 92.2 FL (ref 80–100)
NONHDLC SERPL-MCNC: 124 MG/DL (CALC)
PHYTONADIONE SERPL-MCNC: 188 PG/ML (ref 130–1500)
PLATELET # BLD AUTO: 247 THOUSAND/UL (ref 140–400)
PMV BLD REES-ECKER: 10.8 FL (ref 7.5–12.5)
POTASSIUM SERPL-SCNC: 4 MMOL/L (ref 3.5–5.3)
PROT SERPL-MCNC: 6.5 G/DL (ref 6.1–8.1)
PTH-INTACT SERPL-MCNC: 27 PG/ML (ref 16–77)
PYRIDOXAL PHOS SERPL-MCNC: 10.1 NG/ML (ref 2.1–21.7)
RBC # BLD AUTO: 4.6 MILLION/UL (ref 3.8–5.1)
SODIUM SERPL-SCNC: 139 MMOL/L (ref 135–146)
TIBC SERPL-MCNC: 315 MCG/DL (CALC) (ref 250–450)
TRIGL SERPL-MCNC: 83 MG/DL
TSH SERPL-ACNC: 2.02 MIU/L
VIT A SERPL-MCNC: 44 MCG/DL (ref 38–98)
VIT B1 BLD-SCNC: 184 NMOL/L (ref 78–185)
VIT B12 SERPL-MCNC: 490 PG/ML (ref 200–1100)
WBC # BLD AUTO: 6.1 THOUSAND/UL (ref 3.8–10.8)
ZINC SERPL-MCNC: 68 MCG/DL (ref 60–130)